# Patient Record
Sex: MALE | Race: WHITE | NOT HISPANIC OR LATINO | Employment: OTHER | URBAN - METROPOLITAN AREA
[De-identification: names, ages, dates, MRNs, and addresses within clinical notes are randomized per-mention and may not be internally consistent; named-entity substitution may affect disease eponyms.]

---

## 2017-02-07 DIAGNOSIS — I10 ESSENTIAL HYPERTENSION WITH GOAL BLOOD PRESSURE LESS THAN 140/90: ICD-10-CM

## 2017-02-07 DIAGNOSIS — I10 HYPERTENSION GOAL BP (BLOOD PRESSURE) < 140/90: ICD-10-CM

## 2017-02-07 DIAGNOSIS — E87.6 HYPOKALEMIA: Primary | ICD-10-CM

## 2017-02-07 NOTE — Clinical Note
[facility]                                           [address] [place]  [address]    February 8, 2017    Kenton Townsend  [address]  [facility] [address]    Dear Kenton,       We recently received a refill request for potassium chloride, lisinopril and amlodipine.  We have refilled this for a one time 30 day supply only because you are due for a:    Blood pressure office visit with Dr Vincent and  lab appointment      Please schedule this lab appointment 4-5 days prior to the office visit.     Please call at your earliest convenience so that there will not be a delay with your future refills.          Thank you,   Your [facility] Care Team/  [phone]

## 2017-02-08 RX ORDER — POTASSIUM CHLORIDE 750 MG/1
10 TABLET, EXTENDED RELEASE ORAL DAILY
Qty: 30 TABLET | Refills: 0 | Status: SHIPPED | OUTPATIENT
Start: 2017-02-08 | End: 2017-02-20

## 2017-02-08 RX ORDER — AMLODIPINE BESYLATE 10 MG/1
10 TABLET ORAL DAILY
Qty: 30 TABLET | Refills: 0 | Status: SHIPPED | OUTPATIENT
Start: 2017-02-08 | End: 2017-02-20

## 2017-02-08 RX ORDER — LISINOPRIL 5 MG/1
5 TABLET ORAL DAILY
Qty: 30 TABLET | Refills: 0 | Status: SHIPPED | OUTPATIENT
Start: 2017-02-08 | End: 2017-02-20

## 2017-02-08 NOTE — TELEPHONE ENCOUNTER
Pt due for nonfasting lab appointment and ov for hypertension for further refills.  Estela Garnica RN

## 2017-02-14 DIAGNOSIS — N18.2 CHRONIC RENAL INSUFFICIENCY, STAGE II (MILD): ICD-10-CM

## 2017-02-14 DIAGNOSIS — Z13.6 CARDIOVASCULAR SCREENING; LDL GOAL LESS THAN 160: ICD-10-CM

## 2017-02-14 LAB
ALBUMIN SERPL-MCNC: 4.2 G/DL (ref 3.4–5)
ANION GAP SERPL CALCULATED.3IONS-SCNC: 6 MMOL/L (ref 3–14)
BUN SERPL-MCNC: 26 MG/DL (ref 7–30)
CALCIUM SERPL-MCNC: 9 MG/DL (ref 8.5–10.1)
CHLORIDE SERPL-SCNC: 106 MMOL/L (ref 94–109)
CO2 SERPL-SCNC: 28 MMOL/L (ref 20–32)
CREAT SERPL-MCNC: 1.67 MG/DL (ref 0.66–1.25)
GFR SERPL CREATININE-BSD FRML MDRD: 40 ML/MIN/1.7M2
GLUCOSE SERPL-MCNC: 111 MG/DL (ref 70–99)
PHOSPHATE SERPL-MCNC: 2.7 MG/DL (ref 2.5–4.5)
POTASSIUM SERPL-SCNC: 4 MMOL/L (ref 3.4–5.3)
SODIUM SERPL-SCNC: 140 MMOL/L (ref 133–144)

## 2017-02-14 PROCEDURE — 80069 RENAL FUNCTION PANEL: CPT | Performed by: FAMILY MEDICINE

## 2017-02-14 PROCEDURE — 36415 COLL VENOUS BLD VENIPUNCTURE: CPT | Performed by: FAMILY MEDICINE

## 2017-02-20 ENCOUNTER — OFFICE VISIT (OUTPATIENT)
Dept: FAMILY MEDICINE | Facility: CLINIC | Age: 77
End: 2017-02-20
Payer: COMMERCIAL

## 2017-02-20 VITALS
WEIGHT: 186 LBS | BODY MASS INDEX: 28.28 KG/M2 | DIASTOLIC BLOOD PRESSURE: 70 MMHG | SYSTOLIC BLOOD PRESSURE: 120 MMHG | OXYGEN SATURATION: 97 % | TEMPERATURE: 97 F | HEART RATE: 67 BPM

## 2017-02-20 DIAGNOSIS — I10 ESSENTIAL HYPERTENSION WITH GOAL BLOOD PRESSURE LESS THAN 140/90: ICD-10-CM

## 2017-02-20 DIAGNOSIS — N18.2 CHRONIC RENAL INSUFFICIENCY, STAGE II (MILD): Primary | ICD-10-CM

## 2017-02-20 DIAGNOSIS — Z23 NEED FOR PROPHYLACTIC VACCINATION AND INOCULATION AGAINST INFLUENZA: ICD-10-CM

## 2017-02-20 DIAGNOSIS — M05.9 RHEUMATOID ARTHRITIS WITH POSITIVE RHEUMATOID FACTOR, INVOLVING UNSPECIFIED SITE (H): ICD-10-CM

## 2017-02-20 DIAGNOSIS — E87.6 HYPOKALEMIA: ICD-10-CM

## 2017-02-20 PROCEDURE — 90662 IIV NO PRSV INCREASED AG IM: CPT | Performed by: FAMILY MEDICINE

## 2017-02-20 PROCEDURE — G0008 ADMIN INFLUENZA VIRUS VAC: HCPCS | Performed by: FAMILY MEDICINE

## 2017-02-20 PROCEDURE — 99214 OFFICE O/P EST MOD 30 MIN: CPT | Mod: 25 | Performed by: FAMILY MEDICINE

## 2017-02-20 PROCEDURE — G0009 ADMIN PNEUMOCOCCAL VACCINE: HCPCS | Performed by: FAMILY MEDICINE

## 2017-02-20 PROCEDURE — 90670 PCV13 VACCINE IM: CPT | Performed by: FAMILY MEDICINE

## 2017-02-20 RX ORDER — AMLODIPINE BESYLATE 10 MG/1
10 TABLET ORAL DAILY
Qty: 90 TABLET | Refills: 1 | Status: SHIPPED | OUTPATIENT
Start: 2017-02-20 | End: 2017-09-19

## 2017-02-20 RX ORDER — LISINOPRIL 5 MG/1
5 TABLET ORAL DAILY
Qty: 90 TABLET | Refills: 1 | Status: SHIPPED | OUTPATIENT
Start: 2017-02-20 | End: 2017-09-19

## 2017-02-20 RX ORDER — POTASSIUM CHLORIDE 750 MG/1
10 TABLET, EXTENDED RELEASE ORAL DAILY
Qty: 90 TABLET | Refills: 1 | Status: SHIPPED | OUTPATIENT
Start: 2017-02-20 | End: 2017-10-19

## 2017-02-20 RX ORDER — HYDROCHLOROTHIAZIDE 12.5 MG/1
12.5 TABLET ORAL DAILY
Qty: 90 TABLET | Refills: 1 | Status: SHIPPED | OUTPATIENT
Start: 2017-02-20 | End: 2017-09-19

## 2017-02-20 NOTE — PROGRESS NOTES
SUBJECTIVE:  Kenton Townsend, a 76 year old male scheduled an appointment to discuss the following issues:  Need for prophylactic vaccination and inoculation against influenza  Follow-up htn, cri (worse), high cholesterol and Arthritis.   Water intake ok. Lots of coffee and some coke. No urine changes or hematuria. Minor leg swelling at times. No nausea, vomiting or diarrhea or bloody/black stools.  No constipation. No abdominal pain or bloody nose.  Outside blood pressure reading. No chest pain or shortness of breath. Some exercise.   Medical, social, surgical, and family histories reviewed.    ROS:    OBJECTIVE:  /70  Pulse 67  Temp 97  F (36.1  C) (Oral)  Wt 186 lb (84.4 kg)  SpO2 97%  BMI 28.28 kg/m2  EXAM:  GENERAL APPEARANCE: healthy, alert and no distress  EYES: EOMI,  PERRL  NECK: no adenopathy, no asymmetry, masses, or scars and thyroid normal to palpation  RESP: lungs clear to auscultation - no rales, rhonchi or wheezes  CV: regular rates and rhythm, normal S1 S2, no S3 or S4 and no murmur, click or rub -  ABDOMEN:  soft, nontender, no HSM or masses and bowel sounds normal  MS: extremities normal- no gross deformities noted, no evidence of inflammation in joints, FROM in all extremities.  PSYCH: mentation appears normal and affect normal/bright    ASSESSMENT / PLAN:  (N18.2) Chronic renal insufficiency, stage II (mild)  (primary encounter diagnosis)  Comment: worse  Plan: CBC with platelets, Renal panel, UA reflex to         Microscopic and Culture        Return to clinic NON-fasting in one month repeat labs. More water/less caffeine. Hold hctz if not improving. Expected course and warning signs reviewed.     (M05.9) Rheumatoid arthritis with positive rheumatoid factor, involving unspecified site (H)  Comment: stable  Plan: per rheumatology    (I10) Essential hypertension with goal blood pressure less than 140/90  Comment: stable  Plan: Renal panel, UA reflex to Microscopic and         Culture,  potassium chloride (K-TAB,KLOR-CON) 10        MEQ tablet, lisinopril (PRINIVIL/ZESTRIL) 5 MG         tablet, amLODIPine (NORVASC) 10 MG tablet,         hydrochlorothiazide 12.5 MG TABS tablet        See above. Reveiwed risks and side effects of medication  Chest pain or shortness of breath to er. Recheck in 6 months  Sooner if worse    (E87.6) Hypokalemia  Comment: stable  Plan: potassium chloride (K-TAB,KLOR-CON) 10 MEQ         tablet            (Z23) Need for prophylactic vaccination and inoculation against influenza    Plan: FLU VACCINE, INCREASED ANTIGEN, PRESV FREE, AGE        65+ [59466], PNEUMOCOCCAL CONJ VACCINE 13         VALENT IM (PREVNAR 13) [28029], Vaccine         Administration, Initial [46938]            Geovanni Vincent MD

## 2017-02-20 NOTE — PROGRESS NOTES
Blood pressure check   Injectable Influenza Immunization Documentation      1.  Is the person to be vaccinated sick today?  No    2. Does the person to be vaccinated have an allergy to eggs or to a component of the vaccine?  No    3. Has the person to be vaccinated today ever had a serious reaction to influenza vaccine in the past?  No    4. Has the person to be vaccinated ever had Guillain-Sedalia syndrome?  No     Form completed by Estela Kendrick CMA

## 2017-02-20 NOTE — MR AVS SNAPSHOT
"              After Visit Summary   2/20/2017    Kenton Townsend    MRN: [id number]           Patient Information     Date Of Birth          1940        Visit Information        Provider Department      2/20/2017 7:35 AM Geovanni Vincent MD [facility]        Today's Diagnoses     Chronic renal insufficiency, stage II (mild)    -  1    Rheumatoid arthritis with positive rheumatoid factor, involving unspecified site (H)        Essential hypertension with goal blood pressure less than 140/90        Hypokalemia        Need for prophylactic vaccination and inoculation against influenza           Follow-ups after your visit        Future tests that were ordered for you today     Open Future Orders        Priority Expected Expires Ordered    CBC with platelets Routine  2/20/2018 2/20/2017    Renal panel Routine  2/20/2018 2/20/2017    UA reflex to Microscopic and Culture Routine  2/20/2018 2/20/2017            Who to contact     If you have questions or need follow up information about today's clinic visit or your schedule please contact [facility] directly at [phone].  Normal or non-critical lab and imaging results will be communicated to you by [facility], letter or phone within 4 business days after the clinic has received the results. If you do not hear from us within 7 days, please contact the clinic through [employer] or phone. If you have a critical or abnormal lab result, we will notify you by phone as soon as possible.  Submit refill requests through [employer] or call your pharmacy and they will forward the refill request to us. Please allow 3 business days for your refill to be completed.          Additional Information About Your Visit        MyChart Information     [employer] lets you send messages to your doctor, view your test results, renew your prescriptions, schedule appointments and more. To sign up, go to [email or web address] . Click on \"Log in\" on the left side of the " "screen, which will take you to the Welcome page. Then click on \"Sign up Now\" on the right side of the page.     You will be asked to enter the access code listed below, as well as some personal information. Please follow the directions to create your username and password.     Your access code is: [email or web address]  Expires: 2017  8:11 AM     Your access code will  in 90 days. If you need help or a new code, please call your [facility] clinic or [phone].        Care EveryWhere ID     This is your Care EveryWhere ID. This could be used by other organizations to access your [facility] medical records  [id number]        Your Vitals Were     Pulse Temperature Pulse Oximetry BMI (Body Mass Index)          67 97  F (36.1  C) (Oral) 97% 28.28 kg/m2         Blood Pressure from Last 3 Encounters:   17 120/70   16 139/82   12/17/15 130/70    Weight from Last 3 Encounters:   17 186 lb (84.4 kg)   16 189 lb (85.7 kg)   12/17/15 182 lb (82.6 kg)              We Performed the Following     FLU VACCINE, INCREASED ANTIGEN, PRESV FREE, AGE 65+ [74698]     PNEUMOCOCCAL CONJ VACCINE 13 VALENT IM (PREVNAR 13) [98611]     Vaccine Administration, Initial [86246]          Today's Medication Changes          These changes are accurate as of: 17  8:11 AM.  If you have any questions, ask your nurse or doctor.               These medicines have changed or have updated prescriptions.        Dose/Directions    amLODIPine 10 MG tablet   Commonly known as:  NORVASC   This may have changed:  additional instructions   Used for:  Essential hypertension with goal blood pressure less than 140/90   Changed by:  Geovanni Vincent MD        Dose:  10 mg   Take 1 tablet (10 mg) by mouth daily For blood pressure at bedtime. Pharmacy ok to hold prescription until due   Quantity:  90 tablet   Refills:  1       lisinopril 5 MG tablet   Commonly known as:  PRINIVIL/ZESTRIL   This may have changed:  additional instructions "   Used for:  Essential hypertension with goal blood pressure less than 140/90   Changed by:  Geovanni Vincent MD        Dose:  5 mg   Take 1 tablet (5 mg) by mouth daily For blood pressure in AM. Pharmacy ok to hold prescription until due   Quantity:  90 tablet   Refills:  1       potassium chloride 10 MEQ tablet   Commonly known as:  K-TAB,KLOR-CON   This may have changed:  additional instructions   Used for:  Hypokalemia, Essential hypertension with goal blood pressure less than 140/90   Changed by:  Geovanni Vincent MD        Dose:  10 mEq   Take 1 tablet (10 mEq) by mouth daily Potassium supplement Pharmacy ok to hold prescription until due   Quantity:  90 tablet   Refills:  1            Where to get your medicines      These medications were sent to [id number] - [place] - [address]  [address] [address] [address]    Hours:  test fax successful 4/5/04 kr Phone:  [phone]     amLODIPine 10 MG tablet    hydrochlorothiazide 12.5 MG Tabs tablet    lisinopril 5 MG tablet    potassium chloride 10 MEQ tablet                Primary Care Provider Office Phone # Fax #    Geovanni Vincent [phone]       [facility] [address] [facility] [address]        Thank you!     Thank you for choosing [facility]  for your care. Our goal is always to provide you with excellent care. Hearing back from our patients is one way we can continue to improve our services. Please take a few minutes to complete the written survey that you may receive in the mail after your visit with us. Thank you!             Your Updated Medication List - Protect others around you: Learn how to safely use, store and throw away your medicines at www.disposemymeds.org.          This list is accurate as of: 2/20/17  8:11 AM.  Always use your most recent med list.                   Brand Name Dispense Instructions for use    amLODIPine 10 MG tablet    NORVASC     90 tablet    Take 1 tablet (10 mg) by mouth daily For blood pressure at bedtime. Pharmacy ok to hold prescription until due       COMBIGAN 0.2-0.5 % ophthalmic solution   Generic drug:  brimonidine-timolol      1 drop 2 times daily.       folic acid 400 MCG tablet    FOLVITE     1 TABLET DAILY       hydrochlorothiazide 12.5 MG Tabs tablet     90 tablet    Take 1 tablet (12.5 mg) by mouth daily For blood pressure in AM Pharmacy ok to hold prescription until due       lisinopril 5 MG tablet    PRINIVIL/ZESTRIL    90 tablet    Take 1 tablet (5 mg) by mouth daily For blood pressure in AM. Pharmacy ok to hold prescription until due       methotrexate sodium 2.5 MG Tabs      4 tablets weekly       potassium chloride 10 MEQ tablet    K-TAB,KLOR-CON    90 tablet    Take 1 tablet (10 mEq) by mouth daily Potassium supplement Pharmacy ok to hold prescription until due       PRESERVISION AREDS PO      1 tablet twice daily       traMADol 50 MG tablet    ULTRAM     1 TABLET EVERY 4 TO 6 HOURS AS NEEDED       XALATAN 0.005 % ophthalmic solution   Generic drug:  latanoprost      Nightly

## 2017-02-20 NOTE — NURSING NOTE
"Chief Complaint   Patient presents with     Hypertension       Initial /70  Pulse 67  Temp 97  F (36.1  C) (Oral)  Wt 186 lb (84.4 kg)  SpO2 97%  BMI 28.28 kg/m2 Estimated body mass index is 28.28 kg/(m^2) as calculated from the following:    Height as of 8/26/15: 5' 8\" (1.727 m).    Weight as of this encounter: 186 lb (84.4 kg).  Medication Reconciliation: complete   Estela Kendrick CMA    "

## 2017-08-24 ENCOUNTER — TRANSFERRED RECORDS (OUTPATIENT)
Dept: HEALTH INFORMATION MANAGEMENT | Facility: CLINIC | Age: 77
End: 2017-08-24

## 2017-09-19 DIAGNOSIS — I10 ESSENTIAL HYPERTENSION WITH GOAL BLOOD PRESSURE LESS THAN 140/90: ICD-10-CM

## 2017-09-19 RX ORDER — AMLODIPINE BESYLATE 10 MG/1
TABLET ORAL
Qty: 30 TABLET | Refills: 0 | Status: SHIPPED | OUTPATIENT
Start: 2017-09-19 | End: 2017-10-19

## 2017-09-19 RX ORDER — LISINOPRIL 5 MG/1
TABLET ORAL
Qty: 30 TABLET | Refills: 0 | Status: SHIPPED | OUTPATIENT
Start: 2017-09-19 | End: 2017-10-19

## 2017-09-19 RX ORDER — HYDROCHLOROTHIAZIDE 12.5 MG/1
TABLET ORAL
Qty: 30 TABLET | Refills: 0 | Status: SHIPPED | OUTPATIENT
Start: 2017-09-19 | End: 2017-10-19

## 2017-09-19 NOTE — TELEPHONE ENCOUNTER
Pt due for fasting lab appointment and ov for hypertension for further refills.  Estela Garnica RN

## 2017-09-19 NOTE — LETTER
September 19, 2017    Kenton Townsend  [address]  [place] [address]    Dear Kenton,       We recently received a refill request for hydrochlorothiazide , lisinopril, and amlodipine.  We have refilled this for a one time 30 day supply only because you are due for a:    Blood pressure office visit with provider and fasting lab appointment      Please schedule this lab appointment 4-5 days prior to the office visit.     Please call at your earliest convenience so that there will not be a delay with your future refills.          Thank you,   Your [facility] Team/  [phone]

## 2017-10-13 DIAGNOSIS — I10 ESSENTIAL HYPERTENSION WITH GOAL BLOOD PRESSURE LESS THAN 140/90: ICD-10-CM

## 2017-10-13 DIAGNOSIS — N18.2 CHRONIC RENAL INSUFFICIENCY, STAGE II (MILD): ICD-10-CM

## 2017-10-13 LAB
ALBUMIN SERPL-MCNC: 3.9 G/DL (ref 3.4–5)
ALBUMIN UR-MCNC: ABNORMAL MG/DL
ANION GAP SERPL CALCULATED.3IONS-SCNC: 6 MMOL/L (ref 3–14)
APPEARANCE UR: CLEAR
BILIRUB UR QL STRIP: NEGATIVE
BUN SERPL-MCNC: 23 MG/DL (ref 7–30)
CALCIUM SERPL-MCNC: 9 MG/DL (ref 8.5–10.1)
CHLORIDE SERPL-SCNC: 107 MMOL/L (ref 94–109)
CO2 SERPL-SCNC: 27 MMOL/L (ref 20–32)
COLOR UR AUTO: YELLOW
CREAT SERPL-MCNC: 1.38 MG/DL (ref 0.66–1.25)
ERYTHROCYTE [DISTWIDTH] IN BLOOD BY AUTOMATED COUNT: 14.9 % (ref 10–15)
GFR SERPL CREATININE-BSD FRML MDRD: 50 ML/MIN/1.7M2
GLUCOSE SERPL-MCNC: 102 MG/DL (ref 70–99)
GLUCOSE UR STRIP-MCNC: NEGATIVE MG/DL
HCT VFR BLD AUTO: 40.2 % (ref 40–53)
HGB BLD-MCNC: 14 G/DL (ref 13.3–17.7)
HGB UR QL STRIP: ABNORMAL
KETONES UR STRIP-MCNC: NEGATIVE MG/DL
LEUKOCYTE ESTERASE UR QL STRIP: NEGATIVE
MCH RBC QN AUTO: 34.6 PG (ref 26.5–33)
MCHC RBC AUTO-ENTMCNC: 34.8 G/DL (ref 31.5–36.5)
MCV RBC AUTO: 99 FL (ref 78–100)
NITRATE UR QL: NEGATIVE
PH UR STRIP: 5.5 PH (ref 5–7)
PHOSPHATE SERPL-MCNC: 2.6 MG/DL (ref 2.5–4.5)
PLATELET # BLD AUTO: 118 10E9/L (ref 150–450)
POTASSIUM SERPL-SCNC: 4.3 MMOL/L (ref 3.4–5.3)
RBC # BLD AUTO: 4.05 10E12/L (ref 4.4–5.9)
RBC #/AREA URNS AUTO: NORMAL /HPF
SODIUM SERPL-SCNC: 140 MMOL/L (ref 133–144)
SOURCE: ABNORMAL
SP GR UR STRIP: 1.02 (ref 1–1.03)
UROBILINOGEN UR STRIP-ACNC: 0.2 EU/DL (ref 0.2–1)
WBC # BLD AUTO: 4.6 10E9/L (ref 4–11)
WBC #/AREA URNS AUTO: NORMAL /HPF

## 2017-10-13 PROCEDURE — 85027 COMPLETE CBC AUTOMATED: CPT | Performed by: FAMILY MEDICINE

## 2017-10-13 PROCEDURE — 80069 RENAL FUNCTION PANEL: CPT | Performed by: FAMILY MEDICINE

## 2017-10-13 PROCEDURE — 81001 URINALYSIS AUTO W/SCOPE: CPT | Performed by: FAMILY MEDICINE

## 2017-10-13 PROCEDURE — 36415 COLL VENOUS BLD VENIPUNCTURE: CPT | Performed by: FAMILY MEDICINE

## 2017-10-15 ENCOUNTER — HEALTH MAINTENANCE LETTER (OUTPATIENT)
Age: 77
End: 2017-10-15

## 2017-10-19 ENCOUNTER — OFFICE VISIT (OUTPATIENT)
Dept: FAMILY MEDICINE | Facility: CLINIC | Age: 77
End: 2017-10-19
Payer: COMMERCIAL

## 2017-10-19 VITALS
HEIGHT: 68 IN | SYSTOLIC BLOOD PRESSURE: 136 MMHG | WEIGHT: 185 LBS | TEMPERATURE: 97.5 F | HEART RATE: 64 BPM | DIASTOLIC BLOOD PRESSURE: 68 MMHG | BODY MASS INDEX: 28.04 KG/M2 | OXYGEN SATURATION: 97 %

## 2017-10-19 DIAGNOSIS — E87.6 HYPOKALEMIA: ICD-10-CM

## 2017-10-19 DIAGNOSIS — Z23 NEED FOR PROPHYLACTIC VACCINATION AND INOCULATION AGAINST INFLUENZA: ICD-10-CM

## 2017-10-19 DIAGNOSIS — M05.9 RHEUMATOID ARTHRITIS WITH POSITIVE RHEUMATOID FACTOR, INVOLVING UNSPECIFIED SITE (H): ICD-10-CM

## 2017-10-19 DIAGNOSIS — I10 ESSENTIAL HYPERTENSION WITH GOAL BLOOD PRESSURE LESS THAN 140/90: Primary | ICD-10-CM

## 2017-10-19 PROCEDURE — 90662 IIV NO PRSV INCREASED AG IM: CPT | Performed by: FAMILY MEDICINE

## 2017-10-19 PROCEDURE — 99214 OFFICE O/P EST MOD 30 MIN: CPT | Mod: 25 | Performed by: FAMILY MEDICINE

## 2017-10-19 PROCEDURE — G0008 ADMIN INFLUENZA VIRUS VAC: HCPCS | Performed by: FAMILY MEDICINE

## 2017-10-19 RX ORDER — POTASSIUM CHLORIDE 750 MG/1
10 TABLET, EXTENDED RELEASE ORAL DAILY
Qty: 90 TABLET | Refills: 1 | Status: SHIPPED | OUTPATIENT
Start: 2017-10-19 | End: 2018-05-09

## 2017-10-19 RX ORDER — LISINOPRIL 5 MG/1
TABLET ORAL
Qty: 90 TABLET | Refills: 1 | Status: SHIPPED | OUTPATIENT
Start: 2017-10-19 | End: 2018-05-09

## 2017-10-19 RX ORDER — AMLODIPINE BESYLATE 10 MG/1
TABLET ORAL
Qty: 90 TABLET | Refills: 1 | Status: SHIPPED | OUTPATIENT
Start: 2017-10-19 | End: 2018-01-20

## 2017-10-19 RX ORDER — HYDROCHLOROTHIAZIDE 12.5 MG/1
TABLET ORAL
Qty: 90 TABLET | Refills: 1 | Status: SHIPPED | OUTPATIENT
Start: 2017-10-19 | End: 2018-05-09

## 2017-10-19 NOTE — NURSING NOTE
"Chief Complaint   Patient presents with     Hypertension     Flu Shot       Initial /68  Pulse 64  Temp 97.5  F (36.4  C) (Oral)  Ht 5' 8\" (1.727 m)  Wt 185 lb (83.9 kg)  SpO2 97%  BMI 28.13 kg/m2 Estimated body mass index is 28.13 kg/(m^2) as calculated from the following:    Height as of this encounter: 5' 8\" (1.727 m).    Weight as of this encounter: 185 lb (83.9 kg).  Medication Reconciliation: complete  Estela Kendrick CMA    "

## 2017-10-19 NOTE — PROGRESS NOTES
Injectable Influenza Immunization Documentation    1.  Is the person to be vaccinated sick today?   No    2. Does the person to be vaccinated have an allergy to a component   of the vaccine?   No    3. Has the person to be vaccinated ever had a serious reaction   to influenza vaccine in the past?   No    4. Has the person to be vaccinated ever had Guillain-Barré syndrome?   No    Form completed by Estela Kendrick [facility]

## 2017-10-19 NOTE — PROGRESS NOTES
"SUBJECTIVE:  Kenton Townsend, a 77 year old male scheduled an appointment to discuss the following issues:  Need for prophylactic vaccination and inoculation against influenza  Follow-up htn, cri , high cholesterol and Arthritis.  No nsaids. One baby asa daily. Water intake ok. 3 cups/coffee day.   No dysuria  Or hematuria. No edema. No chest pain or shortness of breath.   Exercise - needs a little more. No nausea, vomiting or diarrhea or black/bloody stools. Emotionally ok.   Outside blood pressure reading ok. Limited sodium in diet.   Past Medical History:   Diagnosis Date     Glaucoma      Hypertension      rheumatoid arthritis        Past Surgical History:   Procedure Laterality Date     CATARACT IOL, RT/LT  2009    Cataract IOL RT/LT     JOINT REPLACEMENT, HIP RT/LT Left > 20 years ago    Joint Replacement Hip RT/LT     SURGICAL HISTORY OF -   13 years ago    humerus left       Family History   Problem Relation Age of Onset     CANCER Father      unknown     CANCER Brother      [place]      Eye Disorder Brother      Neurologic Disorder Son      vertigo       Social History   Substance Use Topics     Smoking status: Former Smoker     Types: Cigarettes     Quit date: 11/16/1979     Smokeless tobacco: Never Used     Alcohol use Yes      Comment: couple times a week       ROS:  All other ROS negative  OBJECTIVE:  /68  Pulse 64  Temp 97.5  F (36.4  C) (Oral)  Ht 5' 8\" (1.727 m)  Wt 185 lb (83.9 kg)  SpO2 97%  BMI 28.13 kg/m2  EXAM:  GENERAL APPEARANCE: healthy, alert and no distress  EYES: EOMI,  PERRL  NECK: no adenopathy, no asymmetry, masses, or scars and thyroid normal to palpation  RESP: lungs clear to auscultation - no rales, rhonchi or wheezes  CV: regular rates and rhythm, normal S1 S2, no S3 or S4 and no murmur, click or rub -  ABDOMEN:  soft, nontender, no HSM or masses and bowel sounds normal  MS: arthritic changes bilateral hands  PSYCH: mentation appears normal and affect " normal/bright    ASSESSMENT / PLAN:  (I10) Essential hypertension with goal blood pressure less than 140/90  (primary encounter diagnosis)  Comment: stable  Plan: amLODIPine (NORVASC) 10 MG tablet, lisinopril         (PRINIVIL/ZESTRIL) 5 MG tablet,         hydrochlorothiazide 12.5 MG TABS tablet,         potassium chloride (K-TAB,KLOR-CON) 10 MEQ         tablet        Continue self-monitor and limit sodium. Chest pain or shortness of breath to er. Recheck in 6 months  Cr improving. Consider stopping hctz if worse.     (E87.6) Hypokalemia  Comment: stable  Plan: potassium chloride (K-TAB,KLOR-CON) 10 MEQ         tablet        Lots of fruits/veggies    (M05.9) Rheumatoid arthritis with positive rheumatoid factor, involving unspecified site (H)  Comment: stable  Plan: continue meds per rheum    (Z23) Need for prophylactic vaccination and inoculation against influenza  Plan: Vaccine Administration, Initial [28241], FLU         VACCINE, INCREASED ANTIGEN, PRESV FREE, AGE 65+        [02706]            Geovanni Vincent

## 2017-10-19 NOTE — MR AVS SNAPSHOT
"              After Visit Summary   10/19/2017    Kenton Townsend    MRN: [id number]           Patient Information     Date Of Birth          1940        Visit Information        Provider Department      10/19/2017 8:20 AM Geovanni Vincent MD [facility]        Today's Diagnoses     Essential hypertension with goal blood pressure less than 140/90    -  1    Hypokalemia        Rheumatoid arthritis with positive rheumatoid factor, involving unspecified site (H)        Need for prophylactic vaccination and inoculation against influenza           Follow-ups after your visit        Who to contact     If you have questions or need follow up information about today's clinic visit or your schedule please contact [facility] directly at [phone].  Normal or non-critical lab and imaging results will be communicated to you by MyChart, letter or phone within 4 business days after the clinic has received the results. If you do not hear from us within 7 days, please contact the clinic through MyChart or phone. If you have a critical or abnormal lab result, we will notify you by phone as soon as possible.  Submit refill requests through [employer] or call your pharmacy and they will forward the refill request to us. Please allow 3 business days for your refill to be completed.          Additional Information About Your Visit        MyChart Information     [employer] lets you send messages to your doctor, view your test results, renew your prescriptions, schedule appointments and more. To sign up, go to [email or web address] . Click on \"Log in\" on the left side of the screen, which will take you to the Welcome page. Then click on \"Sign up Now\" on the right side of the page.     You will be asked to enter the access code listed below, as well as some personal information. Please follow the directions to create your username and password.     Your access code is: [email or web address]  Expires: 1/17/2018  " "8:40 AM     Your access code will  in 90 days. If you need help or a new code, please call your [place] clinic or [phone].        Care EveryWhere ID     This is your Care EveryWhere ID. This could be used by other organizations to access your [place] medical records  [id number]        Your Vitals Were     Pulse Temperature Height Pulse Oximetry BMI (Body Mass Index)       64 97.5  F (36.4  C) (Oral) 5' 8\" (1.727 m) 97% 28.13 kg/m2        Blood Pressure from Last 3 Encounters:   10/19/17 136/68   17 120/70   16 139/82    Weight from Last 3 Encounters:   10/19/17 185 lb (83.9 kg)   17 186 lb (84.4 kg)   16 189 lb (85.7 kg)              We Performed the Following     FLU VACCINE, INCREASED ANTIGEN, PRESV FREE, AGE 65+ [61461]     Vaccine Administration, Initial [10272]          Today's Medication Changes          These changes are accurate as of: 10/19/17  8:40 AM.  If you have any questions, ask your nurse or doctor.               These medicines have changed or have updated prescriptions.        Dose/Directions    amLODIPine 10 MG tablet   Commonly known as:  NORVASC   This may have changed:  See the new instructions.   Used for:  Essential hypertension with goal blood pressure less than 140/90   Changed by:  Geovanni Vincent MD        TAKE 1 TABLET BY MOUTH EVERY NIGHT AT BEDTIME FOR BLOOD PRESSURE Pharmacy ok to hold prescription until due   Quantity:  90 tablet   Refills:  1       hydrochlorothiazide 12.5 MG Tabs tablet   This may have changed:  See the new instructions.   Used for:  Essential hypertension with goal blood pressure less than 140/90   Changed by:  Geovanni Vincent MD        TAKE 1 TABLET BY MOUTH DAILY FOR BLOOD PRESSURE IN THE MORNING Pharmacy ok to hold prescription until due   Quantity:  90 tablet   Refills:  1       lisinopril 5 MG tablet   Commonly known as:  PRINIVIL/ZESTRIL   This may have changed:  See the new instructions.   Used for:  Essential " hypertension with goal blood pressure less than 140/90   Changed by:  Geovanni Vincent MD        TAKE 1 TABLET BY MOUTH EVERY MORNING FOR BLOOD PRESSURE Pharmacy ok to hold prescription until due   Quantity:  90 tablet   Refills:  1            Where to get your medicines      These medications were sent to [id number] - [place] - [address]  [address] [address] [address]    Hours:  test fax successful 4/5/04 kr Phone:  [phone]     amLODIPine 10 MG tablet    hydrochlorothiazide 12.5 MG Tabs tablet    lisinopril 5 MG tablet    potassium chloride 10 MEQ tablet                Primary Care Provider Office Phone # Fax #    Geovanni Vincent [phone]       [address] [facility] [address]        Equal Access to Services     REGINE HDZ : Hadii elie grover hadasho Soomaali, waaxda luqadaha, qaybta kaalmada adeegyada, waxlorrie garcia hayakash bravo . So [facility] [phone].    ATENCIÓN: Si habla español, tiene a calderon disposición servicios gratuitos de asistencia lingüística. Llame al [phone].    We comply with applicable federal civil rights laws and [place] laws. We do not discriminate on the basis of race, color, national origin, age, disability, sex, sexual orientation, or gender identity.            Thank you!     Thank you for choosing [facility]  for your care. Our goal is always to provide you with excellent care. Hearing back from our patients is one way we can continue to improve our services. Please take a few minutes to complete the written survey that you may receive in the mail after your visit with us. Thank you!             Your Updated Medication List - Protect others around you: Learn how to safely use, store and throw away your medicines at www.disposemymeds.org.          This list is accurate as of: 10/19/17  8:40 AM.  Always use your most recent med list.                   Brand Name Dispense Instructions for  use Diagnosis    amLODIPine 10 MG tablet    NORVASC    90 tablet    TAKE 1 TABLET BY MOUTH EVERY NIGHT AT BEDTIME FOR BLOOD PRESSURE Pharmacy ok to hold prescription until due    Essential hypertension with goal blood pressure less than 140/90       COMBIGAN 0.2-0.5 % ophthalmic solution   Generic drug:  brimonidine-timolol      1 drop 2 times daily.        folic acid 400 MCG tablet    FOLVITE     1 TABLET DAILY        hydrochlorothiazide 12.5 MG Tabs tablet     90 tablet    TAKE 1 TABLET BY MOUTH DAILY FOR BLOOD PRESSURE IN THE MORNING Pharmacy ok to hold prescription until due    Essential hypertension with goal blood pressure less than 140/90       lisinopril 5 MG tablet    PRINIVIL/ZESTRIL    90 tablet    TAKE 1 TABLET BY MOUTH EVERY MORNING FOR BLOOD PRESSURE Pharmacy ok to hold prescription until due    Essential hypertension with goal blood pressure less than 140/90       methotrexate sodium 2.5 MG Tabs      4 tablets weekly        potassium chloride 10 MEQ tablet    K-TAB,KLOR-CON    90 tablet    Take 1 tablet (10 mEq) by mouth daily Potassium supplement Pharmacy ok to hold prescription until due    Hypokalemia, Essential hypertension with goal blood pressure less than 140/90       PRESERVISION AREDS PO      1 tablet twice daily        traMADol 50 MG tablet    ULTRAM     1 TABLET EVERY 4 TO 6 HOURS AS NEEDED        XALATAN 0.005 % ophthalmic solution   Generic drug:  latanoprost      Nightly

## 2018-01-20 DIAGNOSIS — I10 ESSENTIAL HYPERTENSION WITH GOAL BLOOD PRESSURE LESS THAN 140/90: ICD-10-CM

## 2018-01-23 RX ORDER — AMLODIPINE BESYLATE 10 MG/1
TABLET ORAL
Qty: 90 TABLET | Refills: 0 | Status: SHIPPED | OUTPATIENT
Start: 2018-01-23 | End: 2021-05-06

## 2018-05-09 DIAGNOSIS — E87.6 HYPOKALEMIA: ICD-10-CM

## 2018-05-09 DIAGNOSIS — I10 ESSENTIAL HYPERTENSION WITH GOAL BLOOD PRESSURE LESS THAN 140/90: ICD-10-CM

## 2018-05-09 RX ORDER — POTASSIUM CHLORIDE 750 MG/1
TABLET, EXTENDED RELEASE ORAL
Qty: 90 TABLET | Refills: 1 | Status: SHIPPED | OUTPATIENT
Start: 2018-05-09 | End: 2018-12-12

## 2018-05-09 RX ORDER — HYDROCHLOROTHIAZIDE 12.5 MG/1
TABLET ORAL
Qty: 90 TABLET | Refills: 1 | Status: SHIPPED | OUTPATIENT
Start: 2018-05-09 | End: 2018-11-15

## 2018-05-09 RX ORDER — LISINOPRIL 5 MG/1
TABLET ORAL
Qty: 90 TABLET | Refills: 1 | Status: SHIPPED | OUTPATIENT
Start: 2018-05-09 | End: 2018-11-15

## 2018-05-30 ENCOUNTER — OFFICE VISIT (OUTPATIENT)
Dept: PODIATRY | Facility: CLINIC | Age: 78
End: 2018-05-30
Payer: COMMERCIAL

## 2018-05-30 VITALS
HEART RATE: 70 BPM | BODY MASS INDEX: 30.99 KG/M2 | SYSTOLIC BLOOD PRESSURE: 150 MMHG | WEIGHT: 186 LBS | HEIGHT: 65 IN | DIASTOLIC BLOOD PRESSURE: 70 MMHG

## 2018-05-30 DIAGNOSIS — M21.619 BUNION: ICD-10-CM

## 2018-05-30 DIAGNOSIS — M05.9 RHEUMATOID ARTHRITIS WITH POSITIVE RHEUMATOID FACTOR, INVOLVING UNSPECIFIED SITE (H): ICD-10-CM

## 2018-05-30 DIAGNOSIS — M20.42 HAMMER TOE OF LEFT FOOT: Primary | ICD-10-CM

## 2018-05-30 PROCEDURE — 99213 OFFICE O/P EST LOW 20 MIN: CPT | Performed by: PODIATRIST

## 2018-05-30 RX ORDER — PREDNISONE 5 MG/1
5 TABLET ORAL DAILY
COMMUNITY
Start: 2018-05-09 | End: 2022-02-18

## 2018-05-30 NOTE — PROGRESS NOTES
Subjective:    Pt is seen today with the c/c of painful left foot.  This has been symptomatic for the past several months.  Points to PIPJs of 2/3/4 toes.  Has pain w/ ambulation and shoewear and is relieved by rest and going barefoot.  Denies pain in the contralateral foot.  Describes as burning pain.  Has RA.  History of left heel pathology and would like new silcone pad.         ROS:  A 10-point review of systems was performed and is positive for that noted in the HPI and noted above.  All other areas are negative.        Allergies   Allergen Reactions     Nkda [No Known Drug Allergies]        Current Outpatient Prescriptions   Medication Sig Dispense Refill     amLODIPine (NORVASC) 10 MG tablet TAKE 1 TABLET BY MOUTH ONCE DAILY 90 tablet 0     brimonidine-timolol (COMBIGAN) 0.2-0.5 % ophthalmic solution 1 drop 2 times daily.       FOLIC ACID 400 MCG OR TABS 1 TABLET DAILY       hydrochlorothiazide 12.5 MG TABS tablet TAKE ONE TABLET BY MOUTH IN THE MORNING  90 tablet 1     lisinopril (PRINIVIL/ZESTRIL) 5 MG tablet TAKE ONE TABLET BY MOUTH IN THE MORNING  90 tablet 1     METHOTREXATE SODIUM 2.5 MG OR TABS 4 tablets weekly       potassium chloride (K-TAB,KLOR-CON) 10 MEQ tablet TAKE 1 TABLET BY MOUTH ONCE DAILY 90 tablet 1     predniSONE (DELTASONE) 5 MG tablet 5 mg       PRESERVISION AREDS OR 1 tablet twice daily       TRAMADOL HCL 50 MG OR TABS 1 TABLET EVERY 4 TO 6 HOURS AS NEEDED       XALATAN 0.005 % OP SOLN Nightly         Patient Active Problem List   Diagnosis     RA (rheumatoid arthritis) (H)     Glaucoma     CARDIOVASCULAR SCREENING; LDL GOAL LESS THAN 160     Vision loss, left eye     1st degree AV block     Advanced directives, counseling/discussion     Obesity     Hypertension goal BP (blood pressure) < 140/90     Anemia of chronic renal failure, stage 2 (mild)     Chronic renal insufficiency, stage II (mild)     Essential hypertension with goal blood pressure less than 140/90     Rheumatoid  "arthritis with positive rheumatoid factor, involving unspecified site (H)       Past Medical History:   Diagnosis Date     Glaucoma      Hypertension      rheumatoid arthritis        Past Surgical History:   Procedure Laterality Date     CATARACT IOL, RT/LT  2009    Cataract IOL RT/LT     JOINT REPLACEMENT, HIP RT/LT Left > 20 years ago    Joint Replacement Hip RT/LT     SURGICAL HISTORY OF -   13 years ago    humerus left       Family History   Problem Relation Age of Onset     CANCER Father      unknown     CANCER Brother      Hodskins      Eye Disorder Brother      Neurologic Disorder Son      vertigo       Social History   Substance Use Topics     Smoking status: Former Smoker     Types: Cigarettes     Quit date: 11/16/1979     Smokeless tobacco: Never Used     Alcohol use Yes      Comment: couple times a week       Objective:    O:  /70  Pulse 70  Ht 5' 5\" (1.651 m)  Wt 186 lb (84.4 kg)  BMI 30.95 kg/m2.      Constitutional/ general:  Pt is in no apparent distress, appears well-nourished.  Cooperative with history and physical exam.     Psych:  The patient answered questions appropriately.  Normal affect.  Seems to have reasonable expectations, in terms of treatment.     Eyes:  Visual scanning/ tracking without deficit.     Ears:  Response to auditory stimuli is normal.  positive hearing aid devices.  Auricles in proper alignment.     Lymphatic:  Popliteal lymph nodes not enlarged.     Lungs:  Non labored breathing, non labored speech. No cough.  No audible wheezing. Even, quiet breathing.       Vascular:  positive pedal pulses bilaterally for both the DP and PT arteries.  CFT < 3 sec.  positive ankle edema.  positive pedal hair growth.    Neuro:  Alert and oriented x 3. Coordinated gait.  Light touch sensation is intact to the L4, L5, S1 distributions. No obvious deficits.  No evidence of neurological-based weakness, spasticity, or contracture in the lower extremities.      Derm: Normal texture and " turgor.  No erythema, ecchymosis, or cyanosis.  Scar tissue left heel.       Musculoskeletal:    Lower extremity muscle strength is normal.  Patient is ambulatory without an assistive device or brace.  No gross deformities.        Bilateral hammertoe deformities noted with weightbearing with the left being worse.  Left HAV.  Erythema and pain between toes.  No masses noted.  No pain on the metatarsal heads or dorsally.       Assessment:  Left 2/3/4 hammertoes                          RA    Plan:  Discussed conservative treatments such as good shoes with wide forefoot.  Dispensed silipos pads.   Good quality shoes to keep pressure off toe.   Return to clinic prn.    Smooth Holbrook DPM, FACFAS

## 2018-05-30 NOTE — LETTER
5/30/2018         RE: Kenton Townsend  [address]  [place] [address]        Dear Colleague,    Thank you for referring your patient, Kenton Townsend, to the [facility]. Please see a copy of my visit note below.     Subjective:    Pt is seen today with the c/c of painful left foot.  This has been symptomatic for the past several months.  Points to PIPJs of 2/3/4 toes.  Has pain w/ ambulation and shoewear and is relieved by rest and going barefoot.  Denies pain in the contralateral foot.  Describes as burning pain.  Has RA.  History of left heel pathology and would like new silcone pad.         ROS:  A 10-point review of systems was performed and is positive for that noted in the HPI and noted above.  All other areas are negative.        Allergies   Allergen Reactions     Nkda [No Known Drug Allergies]        Current Outpatient Prescriptions   Medication Sig Dispense Refill     amLODIPine (NORVASC) 10 MG tablet TAKE 1 TABLET BY MOUTH ONCE DAILY 90 tablet 0     brimonidine-timolol (COMBIGAN) 0.2-0.5 % ophthalmic solution 1 drop 2 times daily.       FOLIC ACID 400 MCG OR TABS 1 TABLET DAILY       hydrochlorothiazide 12.5 MG TABS tablet TAKE ONE TABLET BY MOUTH IN THE MORNING  90 tablet 1     lisinopril (PRINIVIL/ZESTRIL) 5 MG tablet TAKE ONE TABLET BY MOUTH IN THE MORNING  90 tablet 1     METHOTREXATE SODIUM 2.5 MG OR TABS 4 tablets weekly       potassium chloride (K-TAB,KLOR-CON) 10 MEQ tablet TAKE 1 TABLET BY MOUTH ONCE DAILY 90 tablet 1     predniSONE (DELTASONE) 5 MG tablet 5 mg       PRESERVISION AREDS OR 1 tablet twice daily       TRAMADOL HCL 50 MG OR TABS 1 TABLET EVERY 4 TO 6 HOURS AS NEEDED       XALATAN 0.005 % OP SOLN Nightly         Patient Active Problem List   Diagnosis     RA (rheumatoid arthritis) (H)     Glaucoma     CARDIOVASCULAR SCREENING; LDL GOAL LESS THAN 160     Vision loss, left eye     1st degree AV block     Advanced directives, counseling/discussion      "Obesity     Hypertension goal BP (blood pressure) < 140/90     Anemia of chronic renal failure, stage 2 (mild)     Chronic renal insufficiency, stage II (mild)     Essential hypertension with goal blood pressure less than 140/90     Rheumatoid arthritis with positive rheumatoid factor, involving unspecified site (H)       Past Medical History:   Diagnosis Date     Glaucoma      Hypertension      rheumatoid arthritis        Past Surgical History:   Procedure Laterality Date     CATARACT IOL, RT/LT  2009    Cataract IOL RT/LT     JOINT REPLACEMENT, HIP RT/LT Left > 20 years ago    Joint Replacement Hip RT/LT     SURGICAL HISTORY OF -   13 years ago    humerus left       Family History   Problem Relation Age of Onset     CANCER Father      unknown     CANCER Brother      Hodskins      Eye Disorder Brother      Neurologic Disorder Son      vertigo       Social History   Substance Use Topics     Smoking status: Former Smoker     Types: Cigarettes     Quit date: 11/16/1979     Smokeless tobacco: Never Used     Alcohol use Yes      Comment: couple times a week       Objective:    O:  /70  Pulse 70  Ht 5' 5\" (1.651 m)  Wt 186 lb (84.4 kg)  BMI 30.95 kg/m2.      Constitutional/ general:  Pt is in no apparent distress, appears well-nourished.  Cooperative with history and physical exam.     Psych:  The patient answered questions appropriately.  Normal affect.  Seems to have reasonable expectations, in terms of treatment.     Eyes:  Visual scanning/ tracking without deficit.     Ears:  Response to auditory stimuli is normal.  positive hearing aid devices.  Auricles in proper alignment.     Lymphatic:  Popliteal lymph nodes not enlarged.     Lungs:  Non labored breathing, non labored speech. No cough.  No audible wheezing. Even, quiet breathing.       Vascular:  positive pedal pulses bilaterally for both the DP and PT arteries.  CFT < 3 sec.  positive ankle edema.  positive pedal hair growth.    Neuro:  Alert and " oriented x 3. Coordinated gait.  Light touch sensation is intact to the L4, L5, S1 distributions. No obvious deficits.  No evidence of neurological-based weakness, spasticity, or contracture in the lower extremities.      Derm: Normal texture and turgor.  No erythema, ecchymosis, or cyanosis.  Scar tissue left heel.       Musculoskeletal:    Lower extremity muscle strength is normal.  Patient is ambulatory without an assistive device or brace.  No gross deformities.        Bilateral hammertoe deformities noted with weightbearing with the left being worse.  Left HAV.  Erythema and pain between toes.  No masses noted.  No pain on the metatarsal heads or dorsally.       Assessment:  Left 2/3/4 hammertoes                          RA    Plan:  Discussed conservative treatments such as good shoes with wide forefoot.  Dispensed silipos pads.   Good quality shoes to keep pressure off toe.   Return to clinic prn.    Smooth Holbrook DPM, FACFAS        Again, thank you for allowing me to participate in the care of your patient.        Sincerely,        Smooth Holbrook DPM

## 2018-05-30 NOTE — PATIENT INSTRUCTIONS
We wish you continued good healing. If you have any questions or concerns, please do not hesitate to contact us at [phone]    Please remember to call and schedule a follow up appointment if one was recommended at your earliest convenience.   PODIATRY CLINIC HOURS  TELEPHONE NUMBER    Dr. Smooth Holbrook D.P.M [facility]    Clinics:  [facility]    Mónica Mckeon [facility]   Tuesday 1PM-6PM  [place]  Wednesday 7AM-2PM  [facility]  Thursday 10AM-6PM  [place]  Friday 7AM-3PM  [place]  Specialty schedulers:   [phone] to make an appointment with any Specialty Provider.        Urgent Care locations:    [facility] Monday-Friday 5 pm - 9 pm. Saturday-Sunday 9 am -5pm    Monday-Friday 11 am - 9 pm Saturday 9 am - 5 pm     Monday-Sunday 12 noon-8PM [phone]     If you need a medication refill, please contact us you may need lab work and/or a follow up visit prior to your refill (i.e. Antifungal medications).    Vsnapt (secure e-mail communication and access to your chart) to send a message or to make an appointment.    If MRI needed please call Rhys Rea at [phone]        Weight management plan: Patient was referred to their PCP to discuss a diet and exercise plan.     Patient to follow up with Primary Care provider regarding elevated blood pressure.

## 2018-05-30 NOTE — MR AVS SNAPSHOT
After Visit Summary   5/30/2018    Kenton Townsend    MRN: [id number]           Patient Information     Date Of Birth          1940        Visit Information        Provider Department      5/30/2018 9:15 AM Smooth Holbrook DPM [facility]        Care Instructions    We wish you continued good healing. If you have any questions or concerns, please do not hesitate to contact us at [phone]    Please remember to call and schedule a follow up appointment if one was recommended at your earliest convenience.   PODIATRY CLINIC HOURS  TELEPHONE NUMBER    Dr. Smooth Holbrook D.P.M FAC FAS    Clinics:  [facility]    Mónica Mckeon [facility]   Tuesday 1PM-6PM  [facility]  Wednesday 7AM-2PM  [place]  Thursday 10AM-6PM  [place]  Friday 7AM-3PM  [place]  Specialty schedulers:   [phone] to make an appointment with any Specialty Provider.        Urgent Care locations:    [facility] Monday-Friday 5 pm - 9 pm. Saturday-Sunday 9 am -5pm    Monday-Friday 11 am - 9 pm Saturday 9 am - 5 pm     Monday-Sunday 12 noon-8PM [phone]     If you need a medication refill, please contact us you may need lab work and/or a follow up visit prior to your refill (i.e. Antifungal medications).    MyChart (secure e-mail communication and access to your chart) to send a message or to make an appointment.    If MRI needed please call Rhys Rea at [phone]        Weight management plan: Patient was referred to their PCP to discuss a diet and exercise plan.     Patient to follow up with Primary Care provider regarding elevated blood pressure.              Follow-ups after your visit        Your next 10 appointments already scheduled     May 30, 2018  9:15 AM CDT   Return Visit with Smooth Holbrook DPM   [facility]     "[id number] [facility] [id number] [phone]              Who to contact     If you have questions or need follow up information about today's clinic visit or your schedule please contact [facility] directly at [phone].  Normal or non-critical lab and imaging results will be communicated to you by MyChart, letter or phone within 4 business days after the clinic has received the results. If you do not hear from us within 7 days, please contact the clinic through MyChart or phone. If you have a critical or abnormal lab result, we will notify you by phone as soon as possible.  Submit refill requests through [employer] or call your pharmacy and they will forward the refill request to us. Please allow 3 business days for your refill to be completed.          Additional Information About Your Visit        Care EveryWhere ID     This is your Care EveryWhere ID. This could be used by other organizations to access your [facility] medical records  [id number]        Your Vitals Were     Pulse Height BMI (Body Mass Index)             70 5' 5\" (1.651 m) 30.95 kg/m2          Blood Pressure from Last 3 Encounters:   05/30/18 150/70   10/19/17 136/68   02/20/17 120/70    Weight from Last 3 Encounters:   05/30/18 186 lb (84.4 kg)   10/19/17 185 lb (83.9 kg)   02/20/17 186 lb (84.4 kg)              Today, you had the following     No orders found for display       Primary Care Provider Office Phone # Fax #    Geovanni Vincent [phone]       [address] [facility] [address]        Equal Access to Services     [facility]: Hadii aad ku hadasho Soomaali, waaxda luqadaha, qaybta kaalmada willie, kali rudolph. So [facility] [phone].    ATENCIÓN: Si habla español, tiene a calderon disposición servicios gratuitos de asistencia lingüística. Llame al [phone].    We comply with applicable federal civil rights laws and [place] laws. We do not discriminate " on the basis of race, color, national origin, age, disability, sex, sexual orientation, or gender identity.            Thank you!     Thank you for choosing [facility]  for your care. Our goal is always to provide you with excellent care. Hearing back from our patients is one way we can continue to improve our services. Please take a few minutes to complete the written survey that you may receive in the mail after your visit with us. Thank you!             Your Updated Medication List - Protect others around you: Learn how to safely use, store and throw away your medicines at www.disposemymeds.org.          This list is accurate as of 5/30/18  9:11 AM.  Always use your most recent med list.                   Brand Name Dispense Instructions for use Diagnosis    amLODIPine 10 MG tablet    NORVASC    90 tablet    TAKE 1 TABLET BY MOUTH ONCE DAILY    Essential hypertension with goal blood pressure less than 140/90       COMBIGAN 0.2-0.5 % ophthalmic solution   Generic drug:  brimonidine-timolol      1 drop 2 times daily.        folic acid 400 MCG tablet    FOLVITE     1 TABLET DAILY        hydrochlorothiazide 12.5 MG Tabs tablet     90 tablet    TAKE ONE TABLET BY MOUTH IN THE MORNING    Essential hypertension with goal blood pressure less than 140/90       lisinopril 5 MG tablet    PRINIVIL/ZESTRIL    90 tablet    TAKE ONE TABLET BY MOUTH IN THE MORNING    Essential hypertension with goal blood pressure less than 140/90       methotrexate sodium 2.5 MG Tabs      4 tablets weekly        potassium chloride 10 MEQ tablet    K-TAB,KLOR-CON    90 tablet    TAKE 1 TABLET BY MOUTH ONCE DAILY    Hypokalemia, Essential hypertension with goal blood pressure less than 140/90       predniSONE 5 MG tablet    DELTASONE     5 mg        PRESERVISION AREDS PO      1 tablet twice daily        traMADol 50 MG tablet    ULTRAM     1 TABLET EVERY 4 TO 6 HOURS AS NEEDED        XALATAN 0.005 % ophthalmic solution   Generic  drug:  latanoprost      Nightly

## 2018-11-15 DIAGNOSIS — I10 ESSENTIAL HYPERTENSION WITH GOAL BLOOD PRESSURE LESS THAN 140/90: ICD-10-CM

## 2018-11-16 RX ORDER — LISINOPRIL 5 MG/1
TABLET ORAL
Qty: 30 TABLET | Refills: 0 | Status: SHIPPED | OUTPATIENT
Start: 2018-11-16 | End: 2018-12-12

## 2018-11-16 RX ORDER — HYDROCHLOROTHIAZIDE 12.5 MG/1
TABLET ORAL
Qty: 30 TABLET | Refills: 0 | Status: SHIPPED | OUTPATIENT
Start: 2018-11-16 | End: 2018-12-12

## 2018-11-16 NOTE — TELEPHONE ENCOUNTER
Medication is being filled for 1 time refill only due to:  Patient needs to be seen for fasting lab appointment and appointment with the provider for further refills. Hypertension.  Estela MALDONADON, RN

## 2018-12-12 DIAGNOSIS — I10 ESSENTIAL HYPERTENSION WITH GOAL BLOOD PRESSURE LESS THAN 140/90: ICD-10-CM

## 2018-12-12 DIAGNOSIS — E87.6 HYPOKALEMIA: ICD-10-CM

## 2018-12-12 RX ORDER — LISINOPRIL 5 MG/1
TABLET ORAL
Qty: 30 TABLET | Refills: 0 | Status: SHIPPED | OUTPATIENT
Start: 2018-12-12 | End: 2019-01-21

## 2018-12-12 RX ORDER — POTASSIUM CHLORIDE 750 MG/1
TABLET, EXTENDED RELEASE ORAL
Qty: 30 TABLET | Refills: 0 | Status: SHIPPED | OUTPATIENT
Start: 2018-12-12 | End: 2019-01-21

## 2018-12-12 RX ORDER — HYDROCHLOROTHIAZIDE 12.5 MG/1
12.5 TABLET ORAL EVERY MORNING
Qty: 30 TABLET | Refills: 0 | Status: SHIPPED | OUTPATIENT
Start: 2018-12-12 | End: 2019-01-21

## 2018-12-13 NOTE — TELEPHONE ENCOUNTER
Routing refill request to provider for review/approval because:  Debbi given x1 and patient did not follow up, please advise  Estela Garnica BSN, RN

## 2019-01-10 ENCOUNTER — TRANSFERRED RECORDS (OUTPATIENT)
Dept: HEALTH INFORMATION MANAGEMENT | Facility: CLINIC | Age: 79
End: 2019-01-10

## 2019-01-21 ENCOUNTER — TELEPHONE (OUTPATIENT)
Dept: FAMILY MEDICINE | Facility: CLINIC | Age: 79
End: 2019-01-21

## 2019-01-21 DIAGNOSIS — I10 ESSENTIAL HYPERTENSION WITH GOAL BLOOD PRESSURE LESS THAN 140/90: ICD-10-CM

## 2019-01-21 DIAGNOSIS — E87.6 HYPOKALEMIA: ICD-10-CM

## 2019-01-21 RX ORDER — LISINOPRIL 5 MG/1
5 TABLET ORAL EVERY MORNING
Qty: 30 TABLET | Refills: 0 | Status: SHIPPED | OUTPATIENT
Start: 2019-01-21 | End: 2019-02-12

## 2019-01-21 RX ORDER — POTASSIUM CHLORIDE 750 MG/1
10 TABLET, EXTENDED RELEASE ORAL DAILY
Qty: 30 TABLET | Refills: 0 | Status: SHIPPED | OUTPATIENT
Start: 2019-01-21 | End: 2019-02-12

## 2019-01-21 RX ORDER — HYDROCHLOROTHIAZIDE 12.5 MG/1
12.5 TABLET ORAL EVERY MORNING
Qty: 30 TABLET | Refills: 0 | Status: SHIPPED | OUTPATIENT
Start: 2019-01-21 | End: 2019-02-12

## 2019-02-05 DIAGNOSIS — E87.6 HYPOKALEMIA: ICD-10-CM

## 2019-02-05 DIAGNOSIS — Z13.6 CARDIOVASCULAR SCREENING; LDL GOAL LESS THAN 160: ICD-10-CM

## 2019-02-05 DIAGNOSIS — I10 ESSENTIAL HYPERTENSION WITH GOAL BLOOD PRESSURE LESS THAN 140/90: ICD-10-CM

## 2019-02-05 DIAGNOSIS — N18.2 ANEMIA OF CHRONIC RENAL FAILURE, STAGE 2 (MILD): ICD-10-CM

## 2019-02-05 DIAGNOSIS — I10 HYPERTENSION GOAL BP (BLOOD PRESSURE) < 140/90: ICD-10-CM

## 2019-02-05 DIAGNOSIS — N18.2 CHRONIC RENAL INSUFFICIENCY, STAGE II (MILD): ICD-10-CM

## 2019-02-05 DIAGNOSIS — D63.1 ANEMIA OF CHRONIC RENAL FAILURE, STAGE 2 (MILD): ICD-10-CM

## 2019-02-05 LAB
ALBUMIN SERPL-MCNC: 4 G/DL (ref 3.4–5)
ANION GAP SERPL CALCULATED.3IONS-SCNC: 6 MMOL/L (ref 3–14)
BASOPHILS # BLD AUTO: 0 10E9/L (ref 0–0.2)
BASOPHILS NFR BLD AUTO: 0.2 %
BUN SERPL-MCNC: 21 MG/DL (ref 7–30)
CALCIUM SERPL-MCNC: 9.1 MG/DL (ref 8.5–10.1)
CHLORIDE SERPL-SCNC: 103 MMOL/L (ref 94–109)
CHOLEST SERPL-MCNC: 165 MG/DL
CO2 SERPL-SCNC: 30 MMOL/L (ref 20–32)
CREAT SERPL-MCNC: 1.38 MG/DL (ref 0.66–1.25)
DIFFERENTIAL METHOD BLD: ABNORMAL
EOSINOPHIL # BLD AUTO: 0.2 10E9/L (ref 0–0.7)
EOSINOPHIL NFR BLD AUTO: 3.2 %
ERYTHROCYTE [DISTWIDTH] IN BLOOD BY AUTOMATED COUNT: 15 % (ref 10–15)
GFR SERPL CREATININE-BSD FRML MDRD: 48 ML/MIN/{1.73_M2}
GLUCOSE SERPL-MCNC: 97 MG/DL (ref 70–99)
HCT VFR BLD AUTO: 42 % (ref 40–53)
HDLC SERPL-MCNC: 28 MG/DL
HGB BLD-MCNC: 14.4 G/DL (ref 13.3–17.7)
LDLC SERPL CALC-MCNC: 74 MG/DL
LYMPHOCYTES # BLD AUTO: 1.4 10E9/L (ref 0.8–5.3)
LYMPHOCYTES NFR BLD AUTO: 23.9 %
MCH RBC QN AUTO: 33.3 PG (ref 26.5–33)
MCHC RBC AUTO-ENTMCNC: 34.3 G/DL (ref 31.5–36.5)
MCV RBC AUTO: 97 FL (ref 78–100)
MONOCYTES # BLD AUTO: 1.1 10E9/L (ref 0–1.3)
MONOCYTES NFR BLD AUTO: 17.9 %
NEUTROPHILS # BLD AUTO: 3.2 10E9/L (ref 1.6–8.3)
NEUTROPHILS NFR BLD AUTO: 54.8 %
NONHDLC SERPL-MCNC: 137 MG/DL
PHOSPHATE SERPL-MCNC: 3 MG/DL (ref 2.5–4.5)
PLATELET # BLD AUTO: 138 10E9/L (ref 150–450)
POTASSIUM SERPL-SCNC: 3.7 MMOL/L (ref 3.4–5.3)
RBC # BLD AUTO: 4.33 10E12/L (ref 4.4–5.9)
SODIUM SERPL-SCNC: 139 MMOL/L (ref 133–144)
TRIGL SERPL-MCNC: 314 MG/DL
WBC # BLD AUTO: 5.9 10E9/L (ref 4–11)

## 2019-02-05 PROCEDURE — 36415 COLL VENOUS BLD VENIPUNCTURE: CPT | Performed by: FAMILY MEDICINE

## 2019-02-05 PROCEDURE — 80061 LIPID PANEL: CPT | Performed by: FAMILY MEDICINE

## 2019-02-05 PROCEDURE — 80069 RENAL FUNCTION PANEL: CPT | Performed by: FAMILY MEDICINE

## 2019-02-05 PROCEDURE — 85025 COMPLETE CBC W/AUTO DIFF WBC: CPT | Performed by: FAMILY MEDICINE

## 2019-02-12 ENCOUNTER — OFFICE VISIT (OUTPATIENT)
Dept: FAMILY MEDICINE | Facility: CLINIC | Age: 79
End: 2019-02-12
Payer: COMMERCIAL

## 2019-02-12 VITALS
HEIGHT: 65 IN | DIASTOLIC BLOOD PRESSURE: 79 MMHG | SYSTOLIC BLOOD PRESSURE: 144 MMHG | WEIGHT: 180 LBS | RESPIRATION RATE: 20 BRPM | OXYGEN SATURATION: 97 % | BODY MASS INDEX: 29.99 KG/M2 | TEMPERATURE: 97.7 F | HEART RATE: 73 BPM

## 2019-02-12 DIAGNOSIS — I10 ESSENTIAL HYPERTENSION WITH GOAL BLOOD PRESSURE LESS THAN 140/90: ICD-10-CM

## 2019-02-12 DIAGNOSIS — E87.6 HYPOKALEMIA: ICD-10-CM

## 2019-02-12 PROCEDURE — 99214 OFFICE O/P EST MOD 30 MIN: CPT | Performed by: FAMILY MEDICINE

## 2019-02-12 RX ORDER — LISINOPRIL/HYDROCHLOROTHIAZIDE 10-12.5 MG
1 TABLET ORAL DAILY
Qty: 90 TABLET | Refills: 1 | Status: SHIPPED | OUTPATIENT
Start: 2019-02-12 | End: 2019-08-08

## 2019-02-12 RX ORDER — POTASSIUM CHLORIDE 750 MG/1
10 TABLET, EXTENDED RELEASE ORAL DAILY
Qty: 90 TABLET | Refills: 3 | Status: SHIPPED | OUTPATIENT
Start: 2019-02-12 | End: 2020-02-19

## 2019-02-12 ASSESSMENT — MIFFLIN-ST. JEOR: SCORE: 1458.35

## 2019-02-12 NOTE — NURSING NOTE
"Chief Complaint   Patient presents with     Hypertension     shingles. pneu       Initial /70   Pulse 73   Temp 97.7  F (36.5  C) (Oral)   Resp 20   Ht 1.651 m (5' 5\")   Wt 81.6 kg (180 lb)   SpO2 97%   BMI 29.95 kg/m   Estimated body mass index is 29.95 kg/m  as calculated from the following:    Height as of this encounter: 1.651 m (5' 5\").    Weight as of this encounter: 81.6 kg (180 lb).    Estela Kendrick CMA    "

## 2019-02-12 NOTE — PROGRESS NOTES
"SUBJECTIVE:  Kenton Townsend, a 78 year old male scheduled an appointment to discuss the following issues:  Follow-up htn, cri , high cholesterol and Arthritis.  Seeing rheum.   No chest pain or shortness of breath. Outside blood pressure reading ok.   No nausea, vomiting or diarrhea. No black or bloody stools. No urine changes or hematuria.   . Lives by self. Friends and family involved. 6 kids.   Taking vitamin D.   Past Medical History:   Diagnosis Date     Glaucoma      Hypertension      rheumatoid arthritis        Past Surgical History:   Procedure Laterality Date     CATARACT IOL, RT/LT      Cataract IOL RT/LT     JOINT REPLACEMENT, HIP RT/LT Left > 20 years ago    Joint Replacement Hip RT/LT     SURGICAL HISTORY OF -   13 years ago    humerus left       Family History   Problem Relation Age of Onset     Cancer Father         unknown     Cancer Brother         Hodskins      Eye Disorder Brother      Neurologic Disorder Son         vertigo       Social History     Tobacco Use     Smoking status: Former Smoker     Types: Cigarettes     Last attempt to quit: 1979     Years since quittin.2     Smokeless tobacco: Never Used   Substance Use Topics     Alcohol use: Yes     Comment: couple times a week       ROS:  All other ROS negative.     OBJECTIVE:  /79   Pulse 73   Temp 97.7  F (36.5  C) (Oral)   Resp 20   Ht 1.651 m (5' 5\")   Wt 81.6 kg (180 lb)   SpO2 97%   BMI 29.95 kg/m    EXAM:  GENERAL APPEARANCE: healthy, alert and no distress  NECK: no adenopathy, no asymmetry, masses, or scars and thyroid normal to palpation  RESP: lungs clear to auscultation - no rales, rhonchi or wheezes  CV: regular rates and rhythm, normal S1 S2, no S3 or S4 and no murmur, click or rub -  ABDOMEN:  soft, nontender, no HSM or masses and bowel sounds normal  MS: extremities normal- no gross deformities noted, no evidence of inflammation in joints, FROM in all extremities.  PSYCH: mentation appears " normal and affect normal/bright    ASSESSMENT / PLAN:  (E87.6) Hypokalemia  Plan: potassium chloride ER (K-TAB/KLOR-CON) 10 MEQ         CR tablet            (I10) Essential hypertension with goal blood pressure less than 140/90  Comment: a little high  Plan: potassium chloride ER (K-TAB/KLOR-CON) 10 MEQ         CR tablet, lisinopril-hydrochlorothiazide         (PRINZIDE/ZESTORETIC) 10-12.5 MG tablet        Will bump up lisinopril from 5 to 10mg and add with hydrochlorothiazide for combo pill. Continue exercise and monitor. Recheck in 6 months  Sooner if worse/new issues. More water. Call/email with questions/concerns    Geovanni Vincent

## 2019-05-15 ENCOUNTER — TELEPHONE (OUTPATIENT)
Dept: FAMILY MEDICINE | Facility: CLINIC | Age: 79
End: 2019-05-15

## 2019-05-15 NOTE — TELEPHONE ENCOUNTER
Panel Management Review      Patient has the following on his problem list:     Hypertension   Last three blood pressure readings:  BP Readings from Last 3 Encounters:   02/12/19 144/79   05/30/18 150/70   10/19/17 136/68     Blood pressure: FAILED    HTN Guidelines:  Less than 140/90      Composite cancer screening  Chart review shows that this patient is due/due soon for the following Colonoscopy  Summary:    Patient is due/failing the following:   BP CHECK and COLONOSCOPY    Action needed:   Patient needs referral/order: Colonoscopy    Type of outreach:    will have Dr. Vincent sign Colonoscopy order. follow 6 mos for b/p    Questions for provider review:    Please sign order.                                                                                                                                     Estela Kendrick CMA       Chart routed to Provider .

## 2019-05-16 NOTE — TELEPHONE ENCOUNTER
Patient is 78year old and not sure why he is flagging to have one? Doesn't it stop after 75yo? Ok for blood pressure/md appointment in next couple months. Geovanni Vincent MD

## 2019-08-08 DIAGNOSIS — I10 ESSENTIAL HYPERTENSION WITH GOAL BLOOD PRESSURE LESS THAN 140/90: ICD-10-CM

## 2019-08-12 RX ORDER — LISINOPRIL/HYDROCHLOROTHIAZIDE 10-12.5 MG
TABLET ORAL
Qty: 30 TABLET | Refills: 0 | Status: SHIPPED | OUTPATIENT
Start: 2019-08-12 | End: 2019-09-05

## 2019-09-05 DIAGNOSIS — I10 ESSENTIAL HYPERTENSION WITH GOAL BLOOD PRESSURE LESS THAN 140/90: ICD-10-CM

## 2019-09-05 NOTE — LETTER
September 10, 2019    Kenton Townsend  [address]  [place] [address]    Dear Kenton,       We recently received a refill request for lisinopril-hydrochlorothiazide (PRINZIDE/ZESTORETIC) 10-12.5 MG tablet.  We have refilled this for a one time 30 day supply only because you are due for a:    Blood pressure/medication check office visit with a provider and fasting lab appointment      Please schedule this lab appointment 4-5 days prior to the office visit.     Please call at your earliest convenience so that there will not be a delay with your future refills.          Thank you,   Your [facility] Team/  [phone]

## 2019-09-10 RX ORDER — LISINOPRIL/HYDROCHLOROTHIAZIDE 10-12.5 MG
TABLET ORAL
Qty: 30 TABLET | Refills: 0 | Status: SHIPPED | OUTPATIENT
Start: 2019-09-10 | End: 2019-09-26

## 2019-09-10 NOTE — TELEPHONE ENCOUNTER
"BP Readings from Last 6 Encounters:   02/12/19 144/79   05/30/18 150/70   10/19/17 136/68   02/20/17 120/70   07/21/16 139/82   12/17/15 130/70     Potassium   Date Value Ref Range Status   02/05/2019 3.7 3.4 - 5.3 mmol/L Final     Requested Prescriptions   Pending Prescriptions Disp Refills     lisinopril-hydrochlorothiazide (PRINZIDE/ZESTORETIC) 10-12.5 MG tablet [Pharmacy Med Name: LISINOPRIL-HCTZ 10-12.5 MG TAB] 30 tablet 0     Sig: TAKE 1 TABLET BY MOUTH EVERY DAY       Diuretics (Including Combos) Protocol Failed - 9/5/2019  9:36 AM        Failed - Blood pressure under 140/90 in past 12 months     BP Readings from Last 3 Encounters:   02/12/19 144/79   05/30/18 150/70   10/19/17 136/68                 Failed - Normal serum creatinine on file in past 12 months     Recent Labs   Lab Test 02/05/19  0739   CR 1.38*              Passed - Recent (12 mo) or future (30 days) visit within the authorizing provider's specialty     Patient had office visit in the last 12 months or has a visit in the next 30 days with authorizing provider or within the authorizing provider's specialty.  See \"Patient Info\" tab in inbasket, or \"Choose Columns\" in Meds & Orders section of the refill encounter.              Passed - Medication is active on med list        Passed - Patient is age 18 or older        Passed - Normal serum potassium on file in past 12 months     Recent Labs   Lab Test 02/05/19  0739   POTASSIUM 3.7                    Passed - Normal serum sodium on file in past 12 months     Recent Labs   Lab Test 02/05/19  0739                 Please advise on refill   Last office visit 2/12/19.  Ami Neil RN     "

## 2019-09-25 ENCOUNTER — DOCUMENTATION ONLY (OUTPATIENT)
Dept: LAB | Facility: CLINIC | Age: 79
End: 2019-09-25

## 2019-09-25 DIAGNOSIS — I10 HYPERTENSION GOAL BP (BLOOD PRESSURE) < 140/90: Primary | ICD-10-CM

## 2019-09-25 NOTE — PROGRESS NOTES
Lab orders pended. Please review, sign and close encounter.     Appointment scheduled 10/8/19 Hypertension.     GULSHAN Cisneros

## 2019-09-26 DIAGNOSIS — I10 ESSENTIAL HYPERTENSION WITH GOAL BLOOD PRESSURE LESS THAN 140/90: ICD-10-CM

## 2019-09-26 RX ORDER — LISINOPRIL/HYDROCHLOROTHIAZIDE 10-12.5 MG
1 TABLET ORAL DAILY
Qty: 30 TABLET | Refills: 0 | Status: SHIPPED | OUTPATIENT
Start: 2019-09-26 | End: 2020-01-15

## 2019-09-26 NOTE — TELEPHONE ENCOUNTER
Pending appointment     Next 5 appointments (look out 90 days)    Oct 08, 2019  3:20 PM CDT  Office Visit with Edwar Coelho MD  [facility] [address] [facility] [address] [phone]        Rx refilled per [facility] refill protocol.    Althea Gambino BSN, RN

## 2019-09-30 DIAGNOSIS — I10 HYPERTENSION GOAL BP (BLOOD PRESSURE) < 140/90: ICD-10-CM

## 2019-09-30 LAB
ALBUMIN SERPL-MCNC: 3.9 G/DL (ref 3.4–5)
ANION GAP SERPL CALCULATED.3IONS-SCNC: 7 MMOL/L (ref 3–14)
BUN SERPL-MCNC: 23 MG/DL (ref 7–30)
CALCIUM SERPL-MCNC: 9.2 MG/DL (ref 8.5–10.1)
CHLORIDE SERPL-SCNC: 108 MMOL/L (ref 94–109)
CO2 SERPL-SCNC: 26 MMOL/L (ref 20–32)
CREAT SERPL-MCNC: 1.45 MG/DL (ref 0.66–1.25)
GFR SERPL CREATININE-BSD FRML MDRD: 45 ML/MIN/{1.73_M2}
GLUCOSE SERPL-MCNC: 94 MG/DL (ref 70–99)
PHOSPHATE SERPL-MCNC: 2.7 MG/DL (ref 2.5–4.5)
POTASSIUM SERPL-SCNC: 4.4 MMOL/L (ref 3.4–5.3)
SODIUM SERPL-SCNC: 141 MMOL/L (ref 133–144)

## 2019-09-30 PROCEDURE — 36415 COLL VENOUS BLD VENIPUNCTURE: CPT | Performed by: FAMILY MEDICINE

## 2019-09-30 PROCEDURE — 80069 RENAL FUNCTION PANEL: CPT | Performed by: FAMILY MEDICINE

## 2019-10-07 NOTE — PROGRESS NOTES
Subjective     Kenton Townsend is a 79 year old male who presents to clinic today for the following health issues:    HPI   Hypertension Follow-up      Do you check your blood pressure regularly outside of the clinic? No     Are you following a low salt diet? No    Are your blood pressures ever more than 140 on the top number (systolic) OR more   than 90 on the bottom number (diastolic), for example 140/90? N/A      How many servings of fruits and vegetables do you eat daily?  0-1    On average, how many sweetened beverages do you drink each day (soda, juice, sweet tea, etc)?   1    How many days per week do you miss taking your medication? 0        Chronic Kidney Disease Follow-up  CKD stage III    Current NSAID use?  No     Advised patient to avoid NSAIDs.    Adequate control of blood pressure to avoid progressing of CKD  Creatinine   Date Value Ref Range Status   2019 1.45 (H) 0.66 - 1.25 mg/dL Final       Rheumatoid Arthritis:    Current medications: Prednisone 5 mg  and Methotrexate  Following with rheumatology     Cognitive screenin) Repeat 3 items (apple computer, Table)    2) Clock draw: passed  3) 3 item recall: Recalls 3 objects  Results: 3 items recalled: COGNITIVE IMPAIRMENT LESS LIKELY      Patient Active Problem List   Diagnosis     RA (rheumatoid arthritis) (H)     Glaucoma     CARDIOVASCULAR SCREENING; LDL GOAL LESS THAN 160     Vision loss, left eye     1st degree AV block     Advanced directives, counseling/discussion     Obesity     Hypertension goal BP (blood pressure) < 140/90     Anemia of chronic renal failure, stage 2 (mild)     Chronic renal insufficiency, stage II (mild)     Essential hypertension with goal blood pressure less than 140/90     Rheumatoid arthritis with positive rheumatoid factor, involving unspecified site (H)     CKD (chronic kidney disease) stage 3, GFR 30-59 ml/min (H)     Past Surgical History:   Procedure Laterality Date     CATARACT IOL, RT/LT       Cataract IOL RT/LT     JOINT REPLACEMENT, HIP RT/LT Left > 20 years ago    Joint Replacement Hip RT/LT     SURGICAL HISTORY OF -   13 years ago    humerus left       Social History     Tobacco Use     Smoking status: Former Smoker     Types: Cigarettes     Last attempt to quit: 1979     Years since quittin.9     Smokeless tobacco: Never Used   Substance Use Topics     Alcohol use: Yes     Comment: couple times a week     Family History   Problem Relation Age of Onset     Cancer Father         unknown     Cancer Brother         Hodskins      Eye Disorder Brother      Neurologic Disorder Son         vertigo         Current Outpatient Medications   Medication Sig Dispense Refill     amLODIPine (NORVASC) 10 MG tablet TAKE 1 TABLET BY MOUTH ONCE DAILY 90 tablet 0     brimonidine-timolol (COMBIGAN) 0.2-0.5 % ophthalmic solution 1 drop 2 times daily.       FOLIC ACID 400 MCG OR TABS 1 TABLET DAILY       lisinopril-hydrochlorothiazide (PRINZIDE/ZESTORETIC) 10-12.5 MG tablet Take 1 tablet by mouth daily 30 tablet 0     potassium chloride ER (K-TAB/KLOR-CON) 10 MEQ CR tablet Take 1 tablet (10 mEq) by mouth daily 90 tablet 3     predniSONE (DELTASONE) 5 MG tablet Take 5 mg by mouth daily .       PRESERVISION AREDS OR 1 tablet twice daily       TRAMADOL HCL 50 MG OR TABS 1 TABLET EVERY 4 TO 6 HOURS AS NEEDED       XALATAN 0.005 % OP SOLN Nightly       methotrexate 2.5 MG tablet Take 3 tablets weekly       Allergies   Allergen Reactions     Nkda [No Known Drug Allergies]      Recent Labs   Lab Test 19  0923 19  0739  16  1015  14  1024   LDL  --  74  --  55  --  88   HDL  --  28*  --  28*  --  23*   TRIG  --  314*  --  183*  --  216*   ALT  --   --   --   --   --  26   CR 1.45* 1.38*   < > 1.38*   < > 1.17   GFRESTIMATED 45* 48*   < > 50*   < > 61   GFRESTBLACK 52* 56*   < > 60*   < > 74   POTASSIUM 4.4 3.7   < > 4.2   < > 3.6    < > = values in this interval not displayed.      BP Readings  from Last 3 Encounters:   10/15/19 128/75   02/12/19 144/79   05/30/18 150/70    Wt Readings from Last 3 Encounters:   10/15/19 85.7 kg (189 lb)   02/12/19 81.6 kg (180 lb)   05/30/18 84.4 kg (186 lb)                    Reviewed and updated as needed this visit by Provider  Tobacco  Meds         Review of Systems   ROS COMP: Constitutional, HEENT, cardiovascular, pulmonary, GI, , musculoskeletal, neuro, skin, endocrine and psych systems are negative, except as otherwise noted.      Objective    /75   Pulse 64   Temp 97.5  F (36.4  C) (Oral)   Wt 85.7 kg (189 lb)   SpO2 96%   BMI 31.45 kg/m    Body mass index is 31.45 kg/m .  Physical Exam   GENERAL: healthy, alert and no distress  EYES: Eyes grossly normal to inspection, PERRL and conjunctivae and sclerae normal  HENT: ear canals and TM's normal, nose and mouth without ulcers or lesions  NECK: no adenopathy, no asymmetry, masses, or scars and thyroid normal to palpation  RESP: lungs clear to auscultation - no rales, rhonchi or wheezes  CV: regular rate and rhythm, normal S1 S2, no S3 or S4, no murmur, click or rub, no peripheral edema and peripheral pulses strong  ABDOMEN: soft, nontender, no hepatosplenomegaly, no masses and bowel sounds normal  MS: no gross musculoskeletal defects noted, no edema  SKIN: no suspicious lesions or rashes  NEURO: Normal strength and tone, mentation intact and speech normal  PSYCH: mentation appears normal, affect normal/bright            Assessment & Plan     1. Hypertension goal BP (blood pressure) < 140/90  Patient with history of hypertension.  Blood pressure is well controlled.  He is currently on lisinopril/chlorothiazide and Norvasc.  Patient is compliant with medications.  Initial blood pressure 157/75, repeat 128/75 patient use salt his food  Discussed low-salt diet and exercise.    2. CKD (chronic kidney disease) stage 3, GFR 30-59 ml/min (H)  Advised patient to avoid NSAIDs.  Adequate control of blood pressure  to avoid progressing of CKD  Chronic problem.  Stable  3. Rheumatoid arthritis with positive rheumatoid factor, involving unspecified site (H)  She with history of rheumatoid arthritis.  Chronic problem.  Stable  Per patient his rheumatologist changed his methotrexate to 7.5 mg weekly(2.5 mg take 3 tablets)  Currently taking prednisone 5 mg and methotrexate 7.5 mg weekly.  I change methotrexate dose in the chart to match what he reported.  Continue current medications.  Patient verbalized understanding and agreed on the plan of care.  All questions answered.  DELMY completed      Return in about 6 months (around 4/15/2020) for Routine Visit.    Edwar Coelho MD  [facility]

## 2019-10-15 ENCOUNTER — OFFICE VISIT (OUTPATIENT)
Dept: FAMILY MEDICINE | Facility: CLINIC | Age: 79
End: 2019-10-15
Payer: COMMERCIAL

## 2019-10-15 VITALS
WEIGHT: 189 LBS | BODY MASS INDEX: 31.45 KG/M2 | HEART RATE: 64 BPM | SYSTOLIC BLOOD PRESSURE: 128 MMHG | TEMPERATURE: 97.5 F | DIASTOLIC BLOOD PRESSURE: 75 MMHG | OXYGEN SATURATION: 96 %

## 2019-10-15 DIAGNOSIS — M05.9 RHEUMATOID ARTHRITIS WITH POSITIVE RHEUMATOID FACTOR, INVOLVING UNSPECIFIED SITE (H): ICD-10-CM

## 2019-10-15 DIAGNOSIS — I10 HYPERTENSION GOAL BP (BLOOD PRESSURE) < 140/90: Primary | ICD-10-CM

## 2019-10-15 DIAGNOSIS — N18.30 CKD (CHRONIC KIDNEY DISEASE) STAGE 3, GFR 30-59 ML/MIN (H): ICD-10-CM

## 2019-10-15 PROCEDURE — 99214 OFFICE O/P EST MOD 30 MIN: CPT | Performed by: FAMILY MEDICINE

## 2019-10-15 PROCEDURE — 99207 C PAF COMPLETED  NO CHARGE: CPT | Performed by: FAMILY MEDICINE

## 2019-10-17 DIAGNOSIS — I10 ESSENTIAL HYPERTENSION WITH GOAL BLOOD PRESSURE LESS THAN 140/90: ICD-10-CM

## 2019-10-17 RX ORDER — LISINOPRIL/HYDROCHLOROTHIAZIDE 10-12.5 MG
TABLET ORAL
Qty: 90 TABLET | Refills: 1 | Status: SHIPPED | OUTPATIENT
Start: 2019-10-17 | End: 2020-03-27

## 2020-01-15 ENCOUNTER — OFFICE VISIT (OUTPATIENT)
Dept: PODIATRY | Facility: CLINIC | Age: 80
End: 2020-01-15
Payer: COMMERCIAL

## 2020-01-15 VITALS
DIASTOLIC BLOOD PRESSURE: 70 MMHG | BODY MASS INDEX: 30.95 KG/M2 | WEIGHT: 186 LBS | HEART RATE: 76 BPM | SYSTOLIC BLOOD PRESSURE: 135 MMHG

## 2020-01-15 DIAGNOSIS — L84 CALLUS: ICD-10-CM

## 2020-01-15 DIAGNOSIS — R23.4 ESCHAR OF FOOT: Primary | ICD-10-CM

## 2020-01-15 DIAGNOSIS — N18.30 CKD (CHRONIC KIDNEY DISEASE) STAGE 3, GFR 30-59 ML/MIN (H): ICD-10-CM

## 2020-01-15 DIAGNOSIS — M20.42 HAMMER TOE OF LEFT FOOT: ICD-10-CM

## 2020-01-15 DIAGNOSIS — M21.619 BUNION: ICD-10-CM

## 2020-01-15 PROCEDURE — 99214 OFFICE O/P EST MOD 30 MIN: CPT | Performed by: PODIATRIST

## 2020-01-15 NOTE — PROGRESS NOTES
Subjective:    Pt is seen today for pain in his left foot.  Has had this for 4 months.  He describes it as a burning pain.  Is aggravated by activity and relieved by rest.  He points the lateral portion of his hallux as to where it hurts.  He is retired.  He has a history of rheumatoid arthritis.  He also has chronic kidney disease stage III.  He is a former smoker.  Both his father and brother had cancer.      ROS:  A 10-point review of systems was performed and is positive for that noted in the HPI and as seen above.  All other areas are negative.          Allergies   Allergen Reactions     Nkda [No Known Drug Allergies]        Current Outpatient Medications   Medication Sig Dispense Refill     amLODIPine (NORVASC) 10 MG tablet TAKE 1 TABLET BY MOUTH ONCE DAILY 90 tablet 0     brimonidine-timolol (COMBIGAN) 0.2-0.5 % ophthalmic solution 1 drop 2 times daily.       FOLIC ACID 400 MCG OR TABS 1 TABLET DAILY       lisinopril-hydrochlorothiazide (PRINZIDE/ZESTORETIC) 10-12.5 MG tablet TAKE 1 TABLET BY MOUTH EVERY DAY 90 tablet 1     methotrexate 2.5 MG tablet Take 3 tablets weekly       potassium chloride ER (K-TAB/KLOR-CON) 10 MEQ CR tablet Take 1 tablet (10 mEq) by mouth daily 90 tablet 3     predniSONE (DELTASONE) 5 MG tablet Take 5 mg by mouth daily .       PRESERVISION AREDS OR 1 tablet twice daily       TRAMADOL HCL 50 MG OR TABS 1 TABLET EVERY 4 TO 6 HOURS AS NEEDED       XALATAN 0.005 % OP SOLN Nightly         Patient Active Problem List   Diagnosis     RA (rheumatoid arthritis) (H)     Glaucoma     CARDIOVASCULAR SCREENING; LDL GOAL LESS THAN 160     Vision loss, left eye     1st degree AV block     Advanced directives, counseling/discussion     Obesity     Hypertension goal BP (blood pressure) < 140/90     Anemia of chronic renal failure, stage 2 (mild)     Chronic renal insufficiency, stage II (mild)     Essential hypertension with goal blood pressure less than 140/90     Rheumatoid arthritis with positive  rheumatoid factor, involving unspecified site (H)     CKD (chronic kidney disease) stage 3, GFR 30-59 ml/min (H)       Past Medical History:   Diagnosis Date     Glaucoma      Hypertension      rheumatoid arthritis        Past Surgical History:   Procedure Laterality Date     CATARACT IOL, RT/LT      Cataract IOL RT/LT     JOINT REPLACEMENT, HIP RT/LT Left > 20 years ago    Joint Replacement Hip RT/LT     SURGICAL HISTORY OF -   13 years ago    humerus left       Family History   Problem Relation Age of Onset     Cancer Father         unknown     Cancer Brother         Hodskins      Eye Disorder Brother      Neurologic Disorder Son         vertigo       Social History     Tobacco Use     Smoking status: Former Smoker     Types: Cigarettes     Last attempt to quit: 1979     Years since quittin.1     Smokeless tobacco: Never Used   Substance Use Topics     Alcohol use: Yes     Comment: couple times a week         Exam:    Vitals: /70   Pulse 76   Wt 84.4 kg (186 lb)   BMI 30.95 kg/m    BMI: Body mass index is 30.95 kg/m .  Height: Data Unavailable    Constitutional/ general:  Pt is in no apparent distress, appears well-nourished.  Cooperative with history and physical exam.     Psych:  The patient answered questions appropriately.  Normal affect.  Seems to have reasonable expectations, in terms of treatment.     Eyes:  Visual scanning/ tracking without deficit.     Ears:  Response to auditory stimuli is normal.   Auricles in proper alignment.     Lymphatic:  Popliteal lymph nodes not enlarged.     Lungs:  Non labored breathing, non labored speech. No cough.  No audible wheezing. Even, quiet breathing.       Vascular:  negative PT arteries.  DP 2/4.  CFT < 3 sec.   Ankle edema and varicosities noted.  No pedal hair growth.    Neuro:  Alert and oriented x 3. Coordinated gait.  Light touch sensation is intact to the L4, L5, S1 distributions. No obvious deficits.  No evidence of neurological-based  weakness, spasticity, or contracture in the lower extremities.  Monofilament intact on all digits    Derm: skin thin, shiny, atrophic.  No erythema, ecchymosis, or cyanosis.      Musculoskeletal:    Lower extremity muscle strength is normal.  Patient is ambulatory without an assistive device or brace.  No gross deformities.  Normal arch.   MS 5/5 all compartments.  All the patient's forefoot joints are somewhat stiff.  He has a bunion deformity.  His left second toe is elevated.  There is some loose skin with an underlying eschar on the lateral portion of his left big toe.  There is no diogo ulceration.  There is underlying prominent bone.      A/P  CKD stage 3  RA  Eschar left hallux  Bunion deformity    Explained to patient how his bunion deformity is causing pressure on the center space resulting in an eschar.  He will make sure shoes are wide enough.  We gave him a silicone  toe cushion to try.  He will try a toe .  We gave him a Budin splint to reduce his second toe to see if this helps offload it.  We also discussed silicone wedge and he will look for these.  He will return the clinic if not improved otherwise as needed    Smooth Holbrook, VISHNU DPVIDAL, FACFAS

## 2020-01-15 NOTE — LETTER
1/15/2020         RE: Kenton Townsend  [address]  [place] [address]        Dear Colleague,    Thank you for referring your patient, Kenton Townsend, to the [facility]. Please see a copy of my visit note below.    Subjective:    Pt is seen today for pain in his left foot.  Has had this for 4 months.  He describes it as a burning pain.  Is aggravated by activity and relieved by rest.  He points the lateral portion of his hallux as to where it hurts.  He is retired.  He has a history of rheumatoid arthritis.  He also has chronic kidney disease stage III.  He is a former smoker.  Both his father and brother had cancer.      ROS:  A 10-point review of systems was performed and is positive for that noted in the HPI and as seen above.  All other areas are negative.          Allergies   Allergen Reactions     Nkda [No Known Drug Allergies]        Current Outpatient Medications   Medication Sig Dispense Refill     amLODIPine (NORVASC) 10 MG tablet TAKE 1 TABLET BY MOUTH ONCE DAILY 90 tablet 0     brimonidine-timolol (COMBIGAN) 0.2-0.5 % ophthalmic solution 1 drop 2 times daily.       FOLIC ACID 400 MCG OR TABS 1 TABLET DAILY       lisinopril-hydrochlorothiazide (PRINZIDE/ZESTORETIC) 10-12.5 MG tablet TAKE 1 TABLET BY MOUTH EVERY DAY 90 tablet 1     methotrexate 2.5 MG tablet Take 3 tablets weekly       potassium chloride ER (K-TAB/KLOR-CON) 10 MEQ CR tablet Take 1 tablet (10 mEq) by mouth daily 90 tablet 3     predniSONE (DELTASONE) 5 MG tablet Take 5 mg by mouth daily .       PRESERVISION AREDS OR 1 tablet twice daily       TRAMADOL HCL 50 MG OR TABS 1 TABLET EVERY 4 TO 6 HOURS AS NEEDED       XALATAN 0.005 % OP SOLN Nightly         Patient Active Problem List   Diagnosis     RA (rheumatoid arthritis) (H)     Glaucoma     CARDIOVASCULAR SCREENING; LDL GOAL LESS THAN 160     Vision loss, left eye     1st degree AV block     Advanced directives, counseling/discussion     Obesity      Hypertension goal BP (blood pressure) < 140/90     Anemia of chronic renal failure, stage 2 (mild)     Chronic renal insufficiency, stage II (mild)     Essential hypertension with goal blood pressure less than 140/90     Rheumatoid arthritis with positive rheumatoid factor, involving unspecified site (H)     CKD (chronic kidney disease) stage 3, GFR 30-59 ml/min (H)       Past Medical History:   Diagnosis Date     Glaucoma      Hypertension      rheumatoid arthritis        Past Surgical History:   Procedure Laterality Date     CATARACT IOL, RT/LT      Cataract IOL RT/LT     JOINT REPLACEMENT, HIP RT/LT Left > 20 years ago    Joint Replacement Hip RT/LT     SURGICAL HISTORY OF -   13 years ago    humerus left       Family History   Problem Relation Age of Onset     Cancer Father         unknown     Cancer Brother         Hodskins      Eye Disorder Brother      Neurologic Disorder Son         vertigo       Social History     Tobacco Use     Smoking status: Former Smoker     Types: Cigarettes     Last attempt to quit: 1979     Years since quittin.1     Smokeless tobacco: Never Used   Substance Use Topics     Alcohol use: Yes     Comment: couple times a week         Exam:    Vitals: /70   Pulse 76   Wt 84.4 kg (186 lb)   BMI 30.95 kg/m     BMI: Body mass index is 30.95 kg/m .  Height: Data Unavailable    Constitutional/ general:  Pt is in no apparent distress, appears well-nourished.  Cooperative with history and physical exam.     Psych:  The patient answered questions appropriately.  Normal affect.  Seems to have reasonable expectations, in terms of treatment.     Eyes:  Visual scanning/ tracking without deficit.     Ears:  Response to auditory stimuli is normal.   Auricles in proper alignment.     Lymphatic:  Popliteal lymph nodes not enlarged.     Lungs:  Non labored breathing, non labored speech. No cough.  No audible wheezing. Even, quiet breathing.       Vascular:  negative PT arteries.   DP 2/4.  CFT < 3 sec.   Ankle edema and varicosities noted.  No pedal hair growth.    Neuro:  Alert and oriented x 3. Coordinated gait.  Light touch sensation is intact to the L4, L5, S1 distributions. No obvious deficits.  No evidence of neurological-based weakness, spasticity, or contracture in the lower extremities.  Monofilament intact on all digits    Derm: skin thin, shiny, atrophic.  No erythema, ecchymosis, or cyanosis.      Musculoskeletal:    Lower extremity muscle strength is normal.  Patient is ambulatory without an assistive device or brace.  No gross deformities.  Normal arch.   MS 5/5 all compartments.  All the patient's forefoot joints are somewhat stiff.  He has a bunion deformity.  His left second toe is elevated.  There is some loose skin with an underlying eschar on the lateral portion of his left big toe.  There is no diogo ulceration.  There is underlying prominent bone.      A/P  CKD stage 3  RA  Eschar left hallux  Bunion deformity    Explained to patient how his bunion deformity is causing pressure on the center space resulting in an eschar.  He will make sure shoes are wide enough.  We gave him a silicone  toe cushion to try.  He will try a toe .  We gave him a Budin splint to reduce his second toe to see if this helps offload it.  We also discussed silicone wedge and he will look for these.  He will return the clinic if not improved otherwise as needed    Smooth Holbrook DPM DPM, FACFAS             Again, thank you for allowing me to participate in the care of your patient.        Sincerely,        Smooth Holborok DPM

## 2020-01-15 NOTE — PATIENT INSTRUCTIONS
Weight management plan: Patient was referred to their PCP to discuss a diet and exercise plan.  We wish you continued good healing. If you have any questions or concerns, please do not hesitate to contact us at [phone]    Please remember to call and schedule a follow up appointment if one was recommended at your earliest convenience.   PODIATRY CLINIC HOURS  TELEPHONE NUMBER    Dr. Smooth Holbrook D.P.[facility]    Clinics:  [facility]    [facility]   Tuesday 1PM-6PM  [place]  Wednesday 7AM-2PM  [facility]  Thursday 10AM-6PM  [place]  Friday 7AM-3PM  [place]  Specialty schedulers:   [phone] to make an appointment with any Specialty Provider.        Urgent Care locations:    [facility] Monday-Friday 5 pm - 9 pm. Saturday-Sunday 9 am -5pm    Monday-Friday 11 am - 9 pm Saturday 9 am - 5 pm     Monday-Sunday 12 noon-8PM [phone]     If you need a medication refill, please contact us you may need lab work and/or a follow up visit prior to your refill (i.e. Antifungal medications).    [facility] (secure e-mail communication and access to your chart) to send a message or to make an appointment.    If MRI needed please call [facility] Re[facility] at [phone]

## 2020-02-19 DIAGNOSIS — E87.6 HYPOKALEMIA: ICD-10-CM

## 2020-02-19 DIAGNOSIS — I10 ESSENTIAL HYPERTENSION WITH GOAL BLOOD PRESSURE LESS THAN 140/90: ICD-10-CM

## 2020-02-19 RX ORDER — POTASSIUM CHLORIDE 750 MG/1
TABLET, EXTENDED RELEASE ORAL
Qty: 90 TABLET | Refills: 0 | Status: SHIPPED | OUTPATIENT
Start: 2020-02-19 | End: 2020-05-20

## 2020-02-19 NOTE — TELEPHONE ENCOUNTER
"Requested Prescriptions   Signed Prescriptions Disp Refills    POTASSIUM CHLORIDE ER 10 MEQ PO CR tablet 90 tablet 0     Sig: TAKE 1 TABLET BY MOUTH EVERY DAY       Potassium Supplements Protocol Passed - 2/19/2020  3:22 AM        Passed - Recent (12 mo) or future (30 days) visit within the authorizing provider's department     Patient has had an office visit with the authorizing provider or a provider within the authorizing providers department within the previous 12 mos or has a future within next 30 days. See \"Patient Info\" tab in inbasket, or \"Choose Columns\" in Meds & Orders section of the refill encounter.              Passed - Medication is active on med list        Passed - Patient is age 18 or older        Passed - Normal serum potassium in past 12 months     Recent Labs   Lab Test 09/30/19  0923   POTASSIUM 4.4                      "

## 2020-03-11 ENCOUNTER — TRANSFERRED RECORDS (OUTPATIENT)
Dept: HEALTH INFORMATION MANAGEMENT | Facility: CLINIC | Age: 80
End: 2020-03-11

## 2020-03-27 DIAGNOSIS — I10 ESSENTIAL HYPERTENSION WITH GOAL BLOOD PRESSURE LESS THAN 140/90: ICD-10-CM

## 2020-03-27 RX ORDER — LISINOPRIL/HYDROCHLOROTHIAZIDE 10-12.5 MG
1 TABLET ORAL DAILY
Qty: 90 TABLET | Refills: 0 | Status: SHIPPED | OUTPATIENT
Start: 2020-03-27 | End: 2020-06-29

## 2020-03-27 NOTE — TELEPHONE ENCOUNTER
"No appointment pending at this time.  Routing to provider to advise.    Althea SORENSON, RN    Requested Prescriptions   Pending Prescriptions Disp Refills     lisinopril-hydrochlorothiazide (ZESTORETIC) 10-12.5 MG tablet 90 tablet 1     Sig: Take 1 tablet by mouth daily       Diuretics (Including Combos) Protocol Failed - 3/27/2020  9:35 AM        Failed - Normal serum creatinine on file in past 12 months     Recent Labs   Lab Test 09/30/19  0923   CR 1.45*              Passed - Blood pressure under 140/90 in past 12 months     BP Readings from Last 3 Encounters:   01/15/20 135/70   10/15/19 128/75   02/12/19 144/79                 Passed - Recent (12 mo) or future (30 days) visit within the authorizing provider's specialty     Patient has had an office visit with the authorizing provider or a provider within the authorizing providers department within the previous 12 mos or has a future within next 30 days. See \"Patient Info\" tab in inbasket, or \"Choose Columns\" in Meds & Orders section of the refill encounter.              Passed - Medication is active on med list        Passed - Patient is age 18 or older        Passed - Normal serum potassium on file in past 12 months     Recent Labs   Lab Test 09/30/19  0923   POTASSIUM 4.4                    Passed - Normal serum sodium on file in past 12 months     Recent Labs   Lab Test 09/30/19  0923                ACE Inhibitors (Including Combos) Protocol Failed - 3/27/2020  9:35 AM        Failed - Normal serum creatinine on file in past 12 months     Recent Labs   Lab Test 09/30/19  0923   CR 1.45*       Ok to refill medication if creatinine is low          Passed - Blood pressure under 140/90 in past 12 months     BP Readings from Last 3 Encounters:   01/15/20 135/70   10/15/19 128/75   02/12/19 144/79                 Passed - Recent (12 mo) or future (30 days) visit within the authorizing provider's specialty     Patient has had an office visit with the " "authorizing provider or a provider within the authorizing providers department within the previous 12 mos or has a future within next 30 days. See \"Patient Info\" tab in inbasket, or \"Choose Columns\" in Meds & Orders section of the refill encounter.              Passed - Medication is active on med list        Passed - Patient is age 18 or older        Passed - Normal serum potassium on file in past 12 months     Recent Labs   Lab Test 09/30/19  0923   POTASSIUM 4.4                  "

## 2020-06-25 ENCOUNTER — TRANSFERRED RECORDS (OUTPATIENT)
Dept: HEALTH INFORMATION MANAGEMENT | Facility: CLINIC | Age: 80
End: 2020-06-25

## 2020-06-25 DIAGNOSIS — I10 ESSENTIAL HYPERTENSION WITH GOAL BLOOD PRESSURE LESS THAN 140/90: ICD-10-CM

## 2020-06-25 NOTE — LETTER
June 29, 2020    Kenton Townsend  [address]  [place] [address]    Dear Kenton,       We recently received a refill request for lisinopril-hydrochlorothiazide (ZESTORETIC) 10-12.5 MG tablet .  We have refilled this for a one time 90 day supply only because you are due for a:    Blood pressure/medication check office visit      Please call at your earliest convenience so that there will not be a delay with your future refills.          Thank you,   Your [facility] Team/  [phone]

## 2020-06-29 RX ORDER — LISINOPRIL/HYDROCHLOROTHIAZIDE 10-12.5 MG
TABLET ORAL
Qty: 90 TABLET | Refills: 0 | Status: SHIPPED | OUTPATIENT
Start: 2020-06-29 | End: 2020-10-16

## 2020-06-29 NOTE — TELEPHONE ENCOUNTER
Routing refill request to provider for review/approval because:  Labs not current: labs not current. Nichelle Aguayo RN      Diuretics (Including Combos) Protocol Failed        Normal serum creatinine on file in past 12 months         Recent Labs   Lab Test 09/30/19  0923   CR 1.45*

## 2020-07-13 ENCOUNTER — DOCUMENTATION ONLY (OUTPATIENT)
Dept: LAB | Facility: CLINIC | Age: 80
End: 2020-07-13

## 2020-07-13 DIAGNOSIS — Z13.6 CARDIOVASCULAR SCREENING; LDL GOAL LESS THAN 160: Primary | ICD-10-CM

## 2020-07-13 DIAGNOSIS — I10 ESSENTIAL HYPERTENSION WITH GOAL BLOOD PRESSURE LESS THAN 140/90: ICD-10-CM

## 2020-07-14 NOTE — PROGRESS NOTES
Please review lab orders sign and close encounter. Gertrude Hardy MA/GULSHAN    BP/med check appt 7/23

## 2020-07-14 NOTE — PROGRESS NOTES
Patient has an up coming pre visit lab appointment on 7.16.2020. Please review pended orders and place any additional future orders that may be needed.     Thank you,  Ariela Hernández MLT ([facility])

## 2020-07-16 DIAGNOSIS — Z13.6 CARDIOVASCULAR SCREENING; LDL GOAL LESS THAN 160: ICD-10-CM

## 2020-07-16 DIAGNOSIS — I10 ESSENTIAL HYPERTENSION WITH GOAL BLOOD PRESSURE LESS THAN 140/90: ICD-10-CM

## 2020-07-16 LAB
ALBUMIN SERPL-MCNC: 3.8 G/DL (ref 3.4–5)
ANION GAP SERPL CALCULATED.3IONS-SCNC: 9 MMOL/L (ref 3–14)
BUN SERPL-MCNC: 33 MG/DL (ref 7–30)
CALCIUM SERPL-MCNC: 9.1 MG/DL (ref 8.5–10.1)
CHLORIDE SERPL-SCNC: 108 MMOL/L (ref 94–109)
CHOLEST SERPL-MCNC: 159 MG/DL
CO2 SERPL-SCNC: 23 MMOL/L (ref 20–32)
CREAT SERPL-MCNC: 1.4 MG/DL (ref 0.66–1.25)
GFR SERPL CREATININE-BSD FRML MDRD: 47 ML/MIN/{1.73_M2}
GLUCOSE SERPL-MCNC: 97 MG/DL (ref 70–99)
HDLC SERPL-MCNC: 35 MG/DL
LDLC SERPL CALC-MCNC: 82 MG/DL
NONHDLC SERPL-MCNC: 124 MG/DL
PHOSPHATE SERPL-MCNC: 3.5 MG/DL (ref 2.5–4.5)
POTASSIUM SERPL-SCNC: 4.2 MMOL/L (ref 3.4–5.3)
SODIUM SERPL-SCNC: 140 MMOL/L (ref 133–144)
TRIGL SERPL-MCNC: 208 MG/DL

## 2020-07-16 PROCEDURE — 36415 COLL VENOUS BLD VENIPUNCTURE: CPT | Performed by: FAMILY MEDICINE

## 2020-07-16 PROCEDURE — 80069 RENAL FUNCTION PANEL: CPT | Performed by: FAMILY MEDICINE

## 2020-07-16 PROCEDURE — 80061 LIPID PANEL: CPT | Performed by: FAMILY MEDICINE

## 2020-07-23 ENCOUNTER — OFFICE VISIT (OUTPATIENT)
Dept: FAMILY MEDICINE | Facility: CLINIC | Age: 80
End: 2020-07-23
Payer: COMMERCIAL

## 2020-07-23 ENCOUNTER — TELEPHONE (OUTPATIENT)
Dept: FAMILY MEDICINE | Facility: CLINIC | Age: 80
End: 2020-07-23

## 2020-07-23 VITALS
SYSTOLIC BLOOD PRESSURE: 102 MMHG | DIASTOLIC BLOOD PRESSURE: 54 MMHG | HEART RATE: 67 BPM | OXYGEN SATURATION: 99 % | WEIGHT: 177 LBS | BODY MASS INDEX: 29.45 KG/M2 | TEMPERATURE: 97.5 F

## 2020-07-23 DIAGNOSIS — M05.9 RHEUMATOID ARTHRITIS WITH POSITIVE RHEUMATOID FACTOR, INVOLVING UNSPECIFIED SITE (H): Primary | ICD-10-CM

## 2020-07-23 DIAGNOSIS — N18.30 CKD (CHRONIC KIDNEY DISEASE) STAGE 3, GFR 30-59 ML/MIN (H): ICD-10-CM

## 2020-07-23 DIAGNOSIS — N52.9 ERECTILE DYSFUNCTION, UNSPECIFIED ERECTILE DYSFUNCTION TYPE: ICD-10-CM

## 2020-07-23 DIAGNOSIS — I10 ESSENTIAL HYPERTENSION WITH GOAL BLOOD PRESSURE LESS THAN 140/90: ICD-10-CM

## 2020-07-23 PROCEDURE — 99214 OFFICE O/P EST MOD 30 MIN: CPT | Performed by: FAMILY MEDICINE

## 2020-07-23 RX ORDER — SILDENAFIL CITRATE 20 MG/1
TABLET ORAL
Qty: 30 TABLET | Refills: 1 | Status: SHIPPED | OUTPATIENT
Start: 2020-07-23 | End: 2020-12-08

## 2020-07-23 RX ORDER — CHOLECALCIFEROL (VITAMIN D3) 25 MCG
CAPSULE ORAL
COMMUNITY
End: 2022-06-20

## 2020-07-23 RX ORDER — AMLODIPINE BESYLATE 5 MG/1
5 TABLET ORAL DAILY
Qty: 90 TABLET | Refills: 3 | Status: SHIPPED | OUTPATIENT
Start: 2020-07-23 | End: 2021-05-06

## 2020-07-23 NOTE — PROGRESS NOTES
SUBJECTIVE:  Kenton Townsend, a 80 year old male scheduled an appointment to discuss the following issues:  Follow-up htn, cri , high cholesterol and Arthritis.  Seeing rheum.  Some weight loss with diet. Some exercise. No chest pain or shortness of breath. Outside blood pressure reading a little low. No nausea, vomiting or diarrhea or black/bloody stools. No leg swelling. No urine changes acutely. Arthritis stable.   Emotionally doing ok. No LIGHTHEADED. Patient would like viagra prescription.   Medical, social, surgical, and family histories reviewed.    ROS:  All other ROS negative.    OBJECTIVE:  /54   Pulse 67   Temp 97.5  F (36.4  C) (Tympanic)   Wt 80.3 kg (177 lb)   SpO2 99%   BMI 29.45 kg/m    EXAM:  GENERAL APPEARANCE: healthy, alert and no distress  EYES: EOMI,  PERRL  NECK: no adenopathy, no asymmetry, masses, or scars and thyroid normal to palpation  RESP: lungs clear to auscultation - no rales, rhonchi or wheezes  CV: regular rates and rhythm, normal S1 S2, no S3 or S4 and no murmur, click or rub -  ABDOMEN:  soft, nontender, no HSM or masses and bowel sounds normal  MS: extremities normal- no gross deformities noted, no evidence of inflammation in joints, FROM in all extremities.  PSYCH: mentation appears normal and affect normal/bright    ASSESSMENT / PLAN:  (M05.9) Rheumatoid arthritis with positive rheumatoid factor, involving unspecified site (H)  (primary encounter diagnosis)  Comment: stable  Plan: per rheum.     (I10) Essential hypertension with goal blood pressure less than 140/90  Comment: over treatment  Plan: amLODIPine (NORVASC) 5 MG tablet        1/2 norvasc and continue self-monitor. Return to clinic if worse. Chest pain or shortness of breath to er.     (N18.3) CKD (chronic kidney disease) stage 3, GFR 30-59 ml/min (H)  Comment: sable  Plan: seeing rheum. Consider stopping hydrochlorothiazide.    (N52.9) Erectile dysfunction, unspecified erectile dysfunction type  Comment:  viagra helpful in past  Plan: sildenafil (REVATIO) 20 MG tablet        To ER if chest pain, shortness of breath , prolonged erections      Geovanni Vincent MD

## 2020-07-23 NOTE — TELEPHONE ENCOUNTER
Central Prior Authorization Team   Phone: [phone]      PRIOR AUTHORIZATION DENIED    Medication: sildenafil (REVATIO) 20 MG tablet - DENIED    Denial Date: 7/23/2020    Denial Rational: The FD approved us for this drug is pulmonary arterial hypertension (PAH) World Health Organization (WHO) Group 1.          Appeal Information:

## 2020-07-23 NOTE — TELEPHONE ENCOUNTER
Prior Authorization Retail Medication Request    Medication/Dose: sildenafil (REVATIO) 20 MG tablet   ICD code (if different than what is on RX):  Erectile dysfunction, unspecified erectile dysfunction type (N52.9)   Previously Tried and Failed:   Rationale:      Insurance Name: MEDICA  Insurance ID:  [id number]      Pharmacy Information (if different than what is on RX)  Name:  [facility] [id number] [place] [address]   Phone: [phone]

## 2020-07-23 NOTE — TELEPHONE ENCOUNTER
Central Prior Authorization Team   Phone: [phone]      PA Initiation    Medication: sildenafil (REVATIO) 20 MG tablet - INITIATED  Insurance Company: [employer] - Phone [phone] Fax [phone]  Pharmacy Filling the Rx: [id number] [place] - [address] [place] [address]  Filling Pharmacy Phone: [phone]  Filling Pharmacy Fax: [phone]  Start Date: 7/23/2020

## 2020-08-11 DIAGNOSIS — E87.6 HYPOKALEMIA: ICD-10-CM

## 2020-08-11 DIAGNOSIS — I10 ESSENTIAL HYPERTENSION WITH GOAL BLOOD PRESSURE LESS THAN 140/90: ICD-10-CM

## 2020-08-11 RX ORDER — POTASSIUM CHLORIDE 750 MG/1
TABLET, EXTENDED RELEASE ORAL
Qty: 90 TABLET | Refills: 0 | Status: SHIPPED | OUTPATIENT
Start: 2020-08-11 | End: 2020-12-04

## 2020-08-11 NOTE — TELEPHONE ENCOUNTER
Refill request received within 30 days of last office visit with pcp.  Prescription is routed to the provider to please address refill.   Ami Neil RN

## 2020-10-15 DIAGNOSIS — I10 ESSENTIAL HYPERTENSION WITH GOAL BLOOD PRESSURE LESS THAN 140/90: ICD-10-CM

## 2020-10-15 NOTE — TELEPHONE ENCOUNTER
Routing refill request to provider for review/approval because:  Labs out of range:    Creatinine   Date Value Ref Range Status   07/16/2020 1.40 (H) 0.66 - 1.25 mg/dL Final         Eula MALDONADON, RN, CPN

## 2020-10-16 RX ORDER — LISINOPRIL/HYDROCHLOROTHIAZIDE 10-12.5 MG
TABLET ORAL
Qty: 90 TABLET | Refills: 0 | Status: SHIPPED | OUTPATIENT
Start: 2020-10-16 | End: 2021-01-11

## 2020-12-07 DIAGNOSIS — N52.9 ERECTILE DYSFUNCTION, UNSPECIFIED ERECTILE DYSFUNCTION TYPE: ICD-10-CM

## 2020-12-08 RX ORDER — SILDENAFIL CITRATE 20 MG/1
TABLET ORAL
Qty: 30 TABLET | Refills: 1 | Status: SHIPPED | OUTPATIENT
Start: 2020-12-08 | End: 2021-02-09

## 2020-12-08 NOTE — TELEPHONE ENCOUNTER
Prescription approved per [facility] Refill Protocol.  Ami Neil RN

## 2020-12-09 ENCOUNTER — TRANSFERRED RECORDS (OUTPATIENT)
Dept: HEALTH INFORMATION MANAGEMENT | Facility: CLINIC | Age: 80
End: 2020-12-09

## 2021-01-11 DIAGNOSIS — I10 ESSENTIAL HYPERTENSION WITH GOAL BLOOD PRESSURE LESS THAN 140/90: ICD-10-CM

## 2021-01-11 RX ORDER — LISINOPRIL/HYDROCHLOROTHIAZIDE 10-12.5 MG
TABLET ORAL
Qty: 90 TABLET | Refills: 0 | Status: SHIPPED | OUTPATIENT
Start: 2021-01-11 | End: 2021-04-12

## 2021-01-11 NOTE — TELEPHONE ENCOUNTER
Routing refill request to provider for review/approval because:  Labs out of range:  Cr  Estela Garnica BSN, RN

## 2021-02-09 DIAGNOSIS — N52.9 ERECTILE DYSFUNCTION, UNSPECIFIED ERECTILE DYSFUNCTION TYPE: ICD-10-CM

## 2021-02-09 RX ORDER — SILDENAFIL CITRATE 20 MG/1
TABLET ORAL
Qty: 30 TABLET | Refills: 1 | Status: SHIPPED | OUTPATIENT
Start: 2021-02-09 | End: 2021-07-19

## 2021-02-09 NOTE — TELEPHONE ENCOUNTER
Prescription approved per [facility] Refill Protocol.  Ami Neil RN

## 2021-03-11 ENCOUNTER — IMMUNIZATION (OUTPATIENT)
Dept: NURSING | Facility: CLINIC | Age: 81
End: 2021-03-11
Payer: COMMERCIAL

## 2021-03-11 PROCEDURE — 91300 PR COVID VAC PFIZER DIL RECON 30 MCG/0.3 ML IM: CPT

## 2021-03-11 PROCEDURE — 0001A PR COVID VAC PFIZER DIL RECON 30 MCG/0.3 ML IM: CPT

## 2021-04-01 ENCOUNTER — IMMUNIZATION (OUTPATIENT)
Dept: NURSING | Facility: CLINIC | Age: 81
End: 2021-04-01
Attending: FAMILY MEDICINE
Payer: COMMERCIAL

## 2021-04-01 PROCEDURE — 91300 PR COVID VAC PFIZER DIL RECON 30 MCG/0.3 ML IM: CPT

## 2021-04-01 PROCEDURE — 0002A PR COVID VAC PFIZER DIL RECON 30 MCG/0.3 ML IM: CPT

## 2021-04-11 DIAGNOSIS — I10 ESSENTIAL HYPERTENSION WITH GOAL BLOOD PRESSURE LESS THAN 140/90: ICD-10-CM

## 2021-04-11 NOTE — LETTER
April 12, 2021    Kenton Townsend  [address]  [place] [address]    Dear Kenton,       We recently received a refill request for lisinopril-hydrochlorothiazide (ZESTORETIC) 10-12.5 MG tablet.  We have refilled this for a one time 90 day supply only because you are due for a:    Blood pressure/medication check office visit and fasting lab appointment-needed in the next 2 months per Dr Vincent.      Please schedule this lab appointment 4-5 days prior to the office visit.     Please call at your earliest convenience so that there will not be a delay with your future refills.          Thank you,   Your [facility] Team/  [phone]

## 2021-04-12 RX ORDER — LISINOPRIL/HYDROCHLOROTHIAZIDE 10-12.5 MG
TABLET ORAL
Qty: 90 TABLET | Refills: 0 | Status: SHIPPED | OUTPATIENT
Start: 2021-04-12 | End: 2021-07-06

## 2021-04-12 NOTE — TELEPHONE ENCOUNTER
Routing refill request to provider for review/approval because:  Labs out of range:  Creatinine    Althea Gambino BSN, RN

## 2021-04-24 ENCOUNTER — DOCUMENTATION ONLY (OUTPATIENT)
Dept: FAMILY MEDICINE | Facility: CLINIC | Age: 81
End: 2021-04-24

## 2021-04-24 DIAGNOSIS — I10 ESSENTIAL HYPERTENSION WITH GOAL BLOOD PRESSURE LESS THAN 140/90: Primary | ICD-10-CM

## 2021-04-24 DIAGNOSIS — E78.5 HYPERLIPIDEMIA LDL GOAL <160: ICD-10-CM

## 2021-04-24 DIAGNOSIS — Z13.6 CARDIOVASCULAR SCREENING; LDL GOAL LESS THAN 160: ICD-10-CM

## 2021-04-25 NOTE — PROGRESS NOTES
Patient scheduled to see Lab on 4/29/2021 for Pre-visit labs.  Patient does not qualify per Pre-visit protocol.  Please place future orders, or call and advise patient to cancel Lab appointment.

## 2021-04-30 DIAGNOSIS — I10 ESSENTIAL HYPERTENSION WITH GOAL BLOOD PRESSURE LESS THAN 140/90: ICD-10-CM

## 2021-04-30 DIAGNOSIS — E78.5 HYPERLIPIDEMIA LDL GOAL <160: ICD-10-CM

## 2021-04-30 DIAGNOSIS — Z13.6 CARDIOVASCULAR SCREENING; LDL GOAL LESS THAN 160: ICD-10-CM

## 2021-04-30 LAB
ALBUMIN SERPL-MCNC: 4 G/DL (ref 3.4–5)
ANION GAP SERPL CALCULATED.3IONS-SCNC: 2 MMOL/L (ref 3–14)
BUN SERPL-MCNC: 33 MG/DL (ref 7–30)
CALCIUM SERPL-MCNC: 9.1 MG/DL (ref 8.5–10.1)
CHLORIDE SERPL-SCNC: 108 MMOL/L (ref 94–109)
CHOLEST SERPL-MCNC: 193 MG/DL
CO2 SERPL-SCNC: 29 MMOL/L (ref 20–32)
CREAT SERPL-MCNC: 1.63 MG/DL (ref 0.66–1.25)
GFR SERPL CREATININE-BSD FRML MDRD: 39 ML/MIN/{1.73_M2}
GLUCOSE SERPL-MCNC: 102 MG/DL (ref 70–99)
HDLC SERPL-MCNC: 36 MG/DL
LDLC SERPL CALC-MCNC: 109 MG/DL
NONHDLC SERPL-MCNC: 157 MG/DL
PHOSPHATE SERPL-MCNC: 3 MG/DL (ref 2.5–4.5)
POTASSIUM SERPL-SCNC: 4.6 MMOL/L (ref 3.4–5.3)
SODIUM SERPL-SCNC: 139 MMOL/L (ref 133–144)
TRIGL SERPL-MCNC: 242 MG/DL

## 2021-04-30 PROCEDURE — 80061 LIPID PANEL: CPT | Performed by: FAMILY MEDICINE

## 2021-04-30 PROCEDURE — 80069 RENAL FUNCTION PANEL: CPT | Performed by: FAMILY MEDICINE

## 2021-04-30 PROCEDURE — 36415 COLL VENOUS BLD VENIPUNCTURE: CPT | Performed by: FAMILY MEDICINE

## 2021-04-30 NOTE — LETTER
May 3, 2021      Kenton Townsend  [address]  [place] [address]        Dear ,    We are writing to inform you of your test results.    Generally normal results except kidney tests worse. Discuss labs and check blood pressure at upcoming md appointment.      If you have any questions or concerns, please call the clinic at the number listed above.       Sincerely,      Geovanni Vincent MD/[facility]      Resulted Orders   Lipid panel reflex to direct LDL Fasting   Result Value Ref Range    Cholesterol 193 <200 mg/dL    Triglycerides 242 (H) <150 mg/dL      Comment:      Borderline high:  150-199 mg/dl  High:             200-499 mg/dl  Very high:       >499 mg/dl      HDL Cholesterol 36 (L) >39 mg/dL    LDL Cholesterol Calculated 109 (H) <100 mg/dL      Comment:      Above desirable:  100-129 mg/dl  Borderline High:  130-159 mg/dL  High:             160-189 mg/dL  Very high:       >189 mg/dl      Non HDL Cholesterol 157 (H) <130 mg/dL      Comment:      Above Desirable:  130-159 mg/dl  Borderline high:  160-189 mg/dl  High:             190-219 mg/dl  Very high:       >219 mg/dl     Renal panel (Alb, BUN, Ca, Cl, CO2, Creat, Gluc, Phos, K, Na)   Result Value Ref Range    Sodium 139 133 - 144 mmol/L    Potassium 4.6 3.4 - 5.3 mmol/L    Chloride 108 94 - 109 mmol/L    Carbon Dioxide 29 20 - 32 mmol/L    Anion Gap 2 (L) 3 - 14 mmol/L    Glucose 102 (H) 70 - 99 mg/dL    Urea Nitrogen 33 (H) 7 - 30 mg/dL    Creatinine 1.63 (H) 0.66 - 1.25 mg/dL    GFR Estimate 39 (L) >60 mL/min/[1.73_m2]      Comment:      Non  GFR Calc  Starting 12/18/2018, serum creatinine based estimated GFR (eGFR) will be   calculated using the Chronic Kidney Disease Epidemiology Collaboration   (CKD-EPI) equation.      GFR Estimate If Black 45 (L) >60 mL/min/[1.73_m2]      Comment:       GFR Calc  Starting 12/18/2018, serum creatinine based estimated GFR (eGFR) will be   calculated using the Chronic  Kidney Disease Epidemiology Collaboration   (CKD-EPI) equation.      Calcium 9.1 8.5 - 10.1 mg/dL    Phosphorus 3.0 2.5 - 4.5 mg/dL    Albumin 4.0 3.4 - 5.0 g/dL

## 2021-05-06 ENCOUNTER — OFFICE VISIT (OUTPATIENT)
Dept: FAMILY MEDICINE | Facility: CLINIC | Age: 81
End: 2021-05-06
Payer: COMMERCIAL

## 2021-05-06 VITALS
TEMPERATURE: 96.9 F | HEIGHT: 67 IN | HEART RATE: 61 BPM | DIASTOLIC BLOOD PRESSURE: 71 MMHG | WEIGHT: 199 LBS | OXYGEN SATURATION: 97 % | SYSTOLIC BLOOD PRESSURE: 133 MMHG | BODY MASS INDEX: 31.23 KG/M2

## 2021-05-06 DIAGNOSIS — I10 ESSENTIAL HYPERTENSION WITH GOAL BLOOD PRESSURE LESS THAN 140/90: Primary | ICD-10-CM

## 2021-05-06 DIAGNOSIS — M05.9 RHEUMATOID ARTHRITIS WITH POSITIVE RHEUMATOID FACTOR, INVOLVING UNSPECIFIED SITE (H): ICD-10-CM

## 2021-05-06 DIAGNOSIS — N18.30 STAGE 3 CHRONIC KIDNEY DISEASE, UNSPECIFIED WHETHER STAGE 3A OR 3B CKD (H): ICD-10-CM

## 2021-05-06 PROCEDURE — 99214 OFFICE O/P EST MOD 30 MIN: CPT | Performed by: FAMILY MEDICINE

## 2021-05-06 RX ORDER — AMLODIPINE BESYLATE 5 MG/1
5 TABLET ORAL DAILY
Qty: 90 TABLET | Refills: 3 | Status: SHIPPED | OUTPATIENT
Start: 2021-05-06 | End: 2022-02-18

## 2021-05-06 ASSESSMENT — MIFFLIN-ST. JEOR: SCORE: 1566.29

## 2021-05-06 NOTE — PROGRESS NOTES
"SUBJECTIVE:  Kenton Townsend, a 80 year old male scheduled an appointment to discuss the following issues:  Follow-up htn, cri , high cholesterol and Arthritis.  Seeing rheum. +weight gain.  Outside blood pressure readings 130/70. No chest pain or shortness of breath. No nausea, vomiting or diarrhea or black/bloody stools. No urine changes or hematuria. No leg swelling. Emotionally doing ok. Memory ok. Water intake good.   Coffee up to 1/2-1 pot/day.    Lives with kid now. .   Medical, social, surgical, and family histories reviewed.    ROS:  All other ROS negative.  OBJECTIVE:  /71   Pulse 61   Temp 96.9  F (36.1  C) (Tympanic)   Ht 1.702 m (5' 7\")   Wt 90.3 kg (199 lb)   SpO2 97%   BMI 31.17 kg/m   EXAM:  GENERAL APPEARANCE: healthy, alert and no distress  EYES: EOMI,  PERRL  NECK: no adenopathy, no asymmetry, masses, or scars and thyroid normal to palpation  RESP: lungs clear to auscultation - no rales, rhonchi or wheezes  CV: regular rates and rhythm, normal S1 S2, no S3 or S4 and no murmur, click or rub -  ABDOMEN:  soft, nontender, no HSM or masses and bowel sounds normal  MS: extremities normal- no gross deformities noted, no evidence of inflammation in joints, FROM in all extremities.  PSYCH: mentation appears normal and affect normal/bright    ASSESSMENT / PLAN:  (I10) Essential hypertension with goal blood pressure less than 140/90  (primary encounter diagnosis)  Comment: stable  Plan: amLODIPine (NORVASC) 5 MG tablet        Continue self-monitor. Chest pain or shortness of breath to er. Recheck in 6 months  Sooner if worse  5-10 lbs weight loss.     (M05.9) Rheumatoid arthritis with positive rheumatoid factor, involving unspecified site (H)  Comment: stable  Plan: per rheum    (N18.30) Stage 3 chronic kidney disease, unspecified whether stage 3a or 3b CKD  Comment: worse but up/down in past and was fasting.   Plan: Recheck in 6 months  More water/less coffee. Return to clinic sooner " if leg swelling/ rapid weight changes/ urine changes.     Geovanni Vincent MD

## 2021-05-30 DIAGNOSIS — E87.6 HYPOKALEMIA: ICD-10-CM

## 2021-05-30 DIAGNOSIS — I10 ESSENTIAL HYPERTENSION WITH GOAL BLOOD PRESSURE LESS THAN 140/90: ICD-10-CM

## 2021-06-01 RX ORDER — POTASSIUM CHLORIDE 750 MG/1
TABLET, EXTENDED RELEASE ORAL
Qty: 90 TABLET | Refills: 1 | Status: SHIPPED | OUTPATIENT
Start: 2021-06-01 | End: 2021-11-24

## 2021-07-06 DIAGNOSIS — I10 ESSENTIAL HYPERTENSION WITH GOAL BLOOD PRESSURE LESS THAN 140/90: ICD-10-CM

## 2021-07-06 RX ORDER — LISINOPRIL/HYDROCHLOROTHIAZIDE 10-12.5 MG
TABLET ORAL
Qty: 90 TABLET | Refills: 1 | Status: SHIPPED | OUTPATIENT
Start: 2021-07-06 | End: 2022-01-31

## 2021-07-19 DIAGNOSIS — N52.9 ERECTILE DYSFUNCTION, UNSPECIFIED ERECTILE DYSFUNCTION TYPE: ICD-10-CM

## 2021-07-19 RX ORDER — SILDENAFIL CITRATE 20 MG/1
TABLET ORAL
Qty: 30 TABLET | Refills: 1 | Status: SHIPPED | OUTPATIENT
Start: 2021-07-19 | End: 2021-10-07

## 2021-10-07 DIAGNOSIS — N52.9 ERECTILE DYSFUNCTION, UNSPECIFIED ERECTILE DYSFUNCTION TYPE: ICD-10-CM

## 2021-10-07 RX ORDER — SILDENAFIL CITRATE 20 MG/1
TABLET ORAL
Qty: 30 TABLET | Refills: 1 | Status: SHIPPED | OUTPATIENT
Start: 2021-10-07 | End: 2022-03-29

## 2022-01-29 DIAGNOSIS — I10 ESSENTIAL HYPERTENSION WITH GOAL BLOOD PRESSURE LESS THAN 140/90: ICD-10-CM

## 2022-01-31 RX ORDER — LISINOPRIL/HYDROCHLOROTHIAZIDE 10-12.5 MG
TABLET ORAL
Qty: 90 TABLET | Refills: 0 | Status: SHIPPED | OUTPATIENT
Start: 2022-01-31 | End: 2022-02-18

## 2022-01-31 NOTE — TELEPHONE ENCOUNTER
"Requested Prescriptions   Pending Prescriptions Disp Refills    lisinopril-hydrochlorothiazide (ZESTORETIC) 10-12.5 MG tablet [Pharmacy Med Name: LISINOPRIL-HCTZ 10-12.5 MG TAB] 90 tablet 1     Sig: TAKE 1 TABLET BY MOUTH EVERY DAY        Diuretics (Including Combos) Protocol Failed - 1/29/2022  1:28 PM        Failed - Normal serum creatinine on file in past 12 months       Recent Labs   Lab Test 04/30/21  1343   CR 1.63*              Passed - Blood pressure under 140/90 in past 12 months       BP Readings from Last 3 Encounters:   05/06/21 133/71   07/23/20 102/54   01/15/20 135/70                 Passed - Recent (12 mo) or future (30 days) visit within the authorizing provider's specialty     Patient has had an office visit with the authorizing provider or a provider within the authorizing providers department within the previous 12 mos or has a future within next 30 days. See \"Patient Info\" tab in inbasket, or \"Choose Columns\" in Meds & Orders section of the refill encounter.              Passed - Medication is active on med list        Passed - Patient is age 18 or older        Passed - Normal serum potassium on file in past 12 months       Recent Labs   Lab Test 04/30/21  1343   POTASSIUM 4.6                    Passed - Normal serum sodium on file in past 12 months       Recent Labs   Lab Test 04/30/21  1343                ACE Inhibitors (Including Combos) Protocol Failed - 1/29/2022  1:28 PM        Failed - Normal serum creatinine on file in past 12 months     Recent Labs   Lab Test 04/30/21  1343   CR 1.63*       Ok to refill medication if creatinine is low          Passed - Blood pressure under 140/90 in past 12 months       BP Readings from Last 3 Encounters:   05/06/21 133/71   07/23/20 102/54   01/15/20 135/70                 Passed - Recent (12 mo) or future (30 days) visit within the authorizing provider's specialty     Patient has had an office visit with the authorizing provider or a provider " "within the authorizing providers department within the previous 12 mos or has a future within next 30 days. See \"Patient Info\" tab in inbasket, or \"Choose Columns\" in Meds & Orders section of the refill encounter.              Passed - Medication is active on med list        Passed - Patient is age 18 or older        Passed - Normal serum potassium on file in past 12 months     Recent Labs   Lab Test 04/30/21  1343   POTASSIUM 4.6                     "

## 2022-02-07 ENCOUNTER — TELEPHONE (OUTPATIENT)
Dept: FAMILY MEDICINE | Facility: CLINIC | Age: 82
End: 2022-02-07
Payer: COMMERCIAL

## 2022-02-07 NOTE — CONFIDENTIAL NOTE
Reason for Call:  Patient was just discharged from the hospital possible stroke.  Was told to follow up with Dr. Vincent. Earliest appointment open is 03/01/2022.      Patient needs to be seen much sooner per discharge summary.    Detailed comments: Please see if patient can be worked into Dr. Rodriguez appointment schedule and Call patient.    Phone Number Patient can be reached at: Home number on file [phone] (home)    Best Time: Anytime    Can we leave a detailed message on this number? YES    Call taken on 2/7/2022 at 10:46 AM by Tiffanie Arguello     [facility]

## 2022-02-08 NOTE — TELEPHONE ENCOUNTER
I have same day spots held every AM and can probably find an eariler day for him if he calls in the AMs.. Geovanni Vincent MD

## 2022-02-10 ENCOUNTER — OFFICE VISIT (OUTPATIENT)
Dept: FAMILY MEDICINE | Facility: CLINIC | Age: 82
End: 2022-02-10
Payer: COMMERCIAL

## 2022-02-10 VITALS
SYSTOLIC BLOOD PRESSURE: 137 MMHG | BODY MASS INDEX: 30.38 KG/M2 | DIASTOLIC BLOOD PRESSURE: 87 MMHG | WEIGHT: 194 LBS | TEMPERATURE: 97.5 F | HEART RATE: 63 BPM | OXYGEN SATURATION: 98 %

## 2022-02-10 DIAGNOSIS — L57.0 ACTINIC KERATOSIS: ICD-10-CM

## 2022-02-10 DIAGNOSIS — I63.9 CEREBROVASCULAR ACCIDENT (CVA), UNSPECIFIED MECHANISM (H): Primary | ICD-10-CM

## 2022-02-10 DIAGNOSIS — E78.5 HYPERLIPIDEMIA LDL GOAL <100: ICD-10-CM

## 2022-02-10 DIAGNOSIS — I10 ESSENTIAL HYPERTENSION WITH GOAL BLOOD PRESSURE LESS THAN 140/90: ICD-10-CM

## 2022-02-10 PROCEDURE — 99214 OFFICE O/P EST MOD 30 MIN: CPT | Mod: 25 | Performed by: FAMILY MEDICINE

## 2022-02-10 PROCEDURE — 17000 DESTRUCT PREMALG LESION: CPT | Performed by: FAMILY MEDICINE

## 2022-02-10 RX ORDER — ATORVASTATIN CALCIUM 40 MG/1
40 TABLET, FILM COATED ORAL DAILY
COMMUNITY
Start: 2022-02-05 | End: 2022-04-14

## 2022-02-10 RX ORDER — CLOPIDOGREL BISULFATE 75 MG/1
75 TABLET ORAL
COMMUNITY
Start: 2022-02-06 | End: 2022-04-14

## 2022-02-10 RX ORDER — LISINOPRIL AND HYDROCHLOROTHIAZIDE 20; 25 MG/1; MG/1
1 TABLET ORAL DAILY
Qty: 90 TABLET | Refills: 0 | Status: SHIPPED | OUTPATIENT
Start: 2022-02-10 | End: 2022-02-18 | Stop reason: ALTCHOICE

## 2022-02-10 RX ORDER — ASPIRIN 81 MG/1
81 TABLET, CHEWABLE ORAL
COMMUNITY
Start: 2022-02-06 | End: 2022-06-20

## 2022-02-10 NOTE — PROGRESS NOTES
ASSESSMENT / PLAN:  (I63.9) Cerebrovascular accident (CVA), unspecified mechanism (H)  (primary encounter diagnosis)  Comment: stable  Plan: CBC with platelets        Seeing vacular surgery in future. Needs tighter control of blood pressure and lipitor should help. Continue plavix/asa. Expected course and warning signs reviewed. To ER if repeat symptoms.    (I10) Essential hypertension with goal blood pressure less than 140/90  Comment: a little high  Plan: lisinopril-hydrochlorothiazide (ZESTORETIC)         20-25 MG tablet, Renal panel        Will double lisinopril/hctz and continue higher norvasc. Continue self-montor. Chest pain or shortness of breath to er. Reveiwed risks and side effects of medication  Return to clinic 2 months repeat labs. Sooner if worse    (E78.5) Hyperlipidemia LDL goal <100  Comment: not bad but history cva-  Needs statin  Plan: Lipid Profile        Continue lipitor. Reveiwed risks and side effects of medication      (L57.0) Actinic keratosis  Comment: likely the lesion on check  Plan: DESTRUCT BENIGN LESION, UP TO 14        Expected course and warning signs reviewed. Dermatology if persists/worse.         Adeola Fung is a 81 year old who presents for the following health issues  Follow-up CVA. Started on lipitor, plavix and norvasc dosage increased. Thought to be from small vessel ischemia. Had cta head/neck and TTE (normal).   Labs done cr=1.7 (baseline). Swel0b=7.9. LDL 84. Cbc ok except platelets at 109.   Outside blood pressure readings a little high.   Word confusion improving. Speech doing ok. No focal weakness. No acute visual changes. No chest pain or shortness of breath. Sleep overall ok.   Emotionally doing ok. . Family helpful. No bleeding issues. No black/bloody stools. No bruising issues.   Seeing vascular surgery. Exercise needs a little more.   Skin lesion on check past year. Flaking/itching.     HPI       Hospital Follow-up Visit:    Hospital/Nursing  [facility]   Date of Admission: 02/03/22  Date of Discharge: 02/05/22  Reason(s) for Admission: Acute CVA       Was your hospitalization related to COVID-19? No   Problems taking medications regularly:  None  Medication changes since discharge: None  Problems adhering to non-medication therapy:  None    Summary of hospitalization:  CareEverywhere information obtained and reviewed  Diagnostic Tests/Treatments reviewed.  Follow up needed: vascular surgery  Other Healthcare Providers Involved in Patient s Care:         None  Update since discharge: improved. Post Discharge Medication Reconciliation: discharge medications reconciled, continue medications without change.  Plan of care communicated with patient            Review of Systems   All other ROS negative.      Objective    There were no vitals taken for this visit.  There is no height or weight on file to calculate BMI.   /87   Pulse 63   Temp 97.5  F (36.4  C) (Tympanic)   Wt 88 kg (194 lb)   SpO2 98%   BMI 30.38 kg/m     Physical Exam   GENERAL: healthy, alert and no distress  EYES: Eyes grossly normal to inspection, PERRL and conjunctivae and sclerae normal  HENT: ear canals and TM's normal, nose and mouth without ulcers or lesions  NECK: no adenopathy, no asymmetry, masses, or scars and thyroid normal to palpation  RESP: lungs clear to auscultation - no rales, rhonchi or wheezes  CV: regular rate and rhythm, normal S1 S2, no S3 or S4, no murmur, click or rub, no peripheral edema and peripheral pulses strong  ABDOMEN: soft, nontender, no hepatosplenomegaly, no masses and bowel sounds normal  MS: no gross musculoskeletal defects noted, no edema  SKIN: raised red lesion on white base 1 cm diameter  NEURO: Normal strength and tone, mentation intact and speech normal  PSYCH: mentation appears normal, affect normal/bright    Procedure: Reveiwed risks and benefits of procedure.  Patient agreeable to plan.  Cryo therapy x2 to lesion.  Patient tolerated well. Aftercare and warning signs reviewed

## 2022-02-14 ENCOUNTER — TRANSFERRED RECORDS (OUTPATIENT)
Dept: HEALTH INFORMATION MANAGEMENT | Facility: CLINIC | Age: 82
End: 2022-02-14
Payer: COMMERCIAL

## 2022-02-15 ENCOUNTER — TELEPHONE (OUTPATIENT)
Dept: FAMILY MEDICINE | Facility: CLINIC | Age: 82
End: 2022-02-15
Payer: COMMERCIAL

## 2022-02-15 NOTE — TELEPHONE ENCOUNTER
Patient saw Dr. Geovanni Vincent for hospital follow up on 2/10 for stroke  Patient taking Amlodipine 5mg 2 tabs daily and lisinopril-hydrochlorothiazide 20mg-25mg daily  Patient calling today a blood pressure has been 160s-170s/90s  Patient denies chest pain or lightheadedness. No headache but has blurred vision that is worsening  Patient today = 179/94  Instructed patient needs to go back to the ED to rule out another stroke  Patient verbalized understanding      Eula MALDONADON, RN

## 2022-02-18 ENCOUNTER — OFFICE VISIT (OUTPATIENT)
Dept: FAMILY MEDICINE | Facility: CLINIC | Age: 82
End: 2022-02-18
Payer: COMMERCIAL

## 2022-02-18 VITALS
HEIGHT: 67 IN | WEIGHT: 189 LBS | SYSTOLIC BLOOD PRESSURE: 150 MMHG | BODY MASS INDEX: 29.66 KG/M2 | OXYGEN SATURATION: 98 % | HEART RATE: 66 BPM | DIASTOLIC BLOOD PRESSURE: 78 MMHG

## 2022-02-18 DIAGNOSIS — I10 ESSENTIAL HYPERTENSION WITH GOAL BLOOD PRESSURE LESS THAN 140/90: ICD-10-CM

## 2022-02-18 DIAGNOSIS — N18.32 STAGE 3B CHRONIC KIDNEY DISEASE (H): ICD-10-CM

## 2022-02-18 DIAGNOSIS — N17.9 ACUTE KIDNEY INJURY (H): ICD-10-CM

## 2022-02-18 DIAGNOSIS — E87.6 HYPOKALEMIA: ICD-10-CM

## 2022-02-18 DIAGNOSIS — I10 HYPERTENSION GOAL BP (BLOOD PRESSURE) < 140/90: Primary | ICD-10-CM

## 2022-02-18 DIAGNOSIS — M05.9 RHEUMATOID ARTHRITIS WITH POSITIVE RHEUMATOID FACTOR, INVOLVING UNSPECIFIED SITE (H): Primary | ICD-10-CM

## 2022-02-18 LAB
ANION GAP SERPL CALCULATED.3IONS-SCNC: 5 MMOL/L (ref 3–14)
BUN SERPL-MCNC: 61 MG/DL (ref 7–30)
CALCIUM SERPL-MCNC: 10.3 MG/DL (ref 8.5–10.1)
CHLORIDE BLD-SCNC: 108 MMOL/L (ref 94–109)
CO2 SERPL-SCNC: 25 MMOL/L (ref 20–32)
CREAT SERPL-MCNC: 2.28 MG/DL (ref 0.66–1.25)
CREAT UR-MCNC: 138 MG/DL
GFR SERPL CREATININE-BSD FRML MDRD: 28 ML/MIN/1.73M2
GLUCOSE BLD-MCNC: 114 MG/DL (ref 70–99)
HGB BLD-MCNC: 14.1 G/DL (ref 13.3–17.7)
MICROALBUMIN UR-MCNC: 7 MG/L
MICROALBUMIN/CREAT UR: 5.07 MG/G CR (ref 0–17)
POTASSIUM BLD-SCNC: 5.4 MMOL/L (ref 3.4–5.3)
SODIUM SERPL-SCNC: 138 MMOL/L (ref 133–144)

## 2022-02-18 PROCEDURE — 99214 OFFICE O/P EST MOD 30 MIN: CPT | Performed by: NURSE PRACTITIONER

## 2022-02-18 PROCEDURE — 36415 COLL VENOUS BLD VENIPUNCTURE: CPT | Performed by: NURSE PRACTITIONER

## 2022-02-18 PROCEDURE — 85018 HEMOGLOBIN: CPT | Performed by: NURSE PRACTITIONER

## 2022-02-18 PROCEDURE — 80048 BASIC METABOLIC PNL TOTAL CA: CPT | Performed by: NURSE PRACTITIONER

## 2022-02-18 PROCEDURE — 82043 UR ALBUMIN QUANTITATIVE: CPT | Performed by: NURSE PRACTITIONER

## 2022-02-18 RX ORDER — BRIMONIDINE TARTRATE 2 MG/ML
SOLUTION/ DROPS OPHTHALMIC AT BEDTIME
COMMUNITY
Start: 2022-02-15

## 2022-02-18 RX ORDER — POTASSIUM CHLORIDE 750 MG/1
TABLET, EXTENDED RELEASE ORAL
Qty: 90 TABLET | Refills: 0 | Status: SHIPPED | OUTPATIENT
Start: 2022-02-18 | End: 2022-02-21 | Stop reason: SINTOL

## 2022-02-18 RX ORDER — PREDNISONE 5 MG/1
5 TABLET ORAL DAILY
Qty: 30 TABLET | Refills: 1 | Status: SHIPPED | OUTPATIENT
Start: 2022-02-18 | End: 2022-04-14

## 2022-02-18 RX ORDER — AMLODIPINE BESYLATE 10 MG/1
10 TABLET ORAL DAILY
COMMUNITY
Start: 2022-02-18 | End: 2022-04-14

## 2022-02-18 RX ORDER — HYDRALAZINE HYDROCHLORIDE 25 MG/1
25 TABLET, FILM COATED ORAL 2 TIMES DAILY
Qty: 60 TABLET | Refills: 2 | Status: SHIPPED | OUTPATIENT
Start: 2022-02-18 | End: 2022-03-14

## 2022-02-18 RX ORDER — LISINOPRIL/HYDROCHLOROTHIAZIDE 10-12.5 MG
2 TABLET ORAL DAILY
Start: 2022-02-18 | End: 2022-02-21 | Stop reason: SINTOL

## 2022-02-18 NOTE — PROGRESS NOTES
"  Assessment & Plan     Hypertension goal BP (blood pressure) < 140/90  BP not at goal, but improved since ER visit.   Continue amlodipine 10 mg and lisinopril-HCTZ 20-25 mg-med list updated to reflect current dosing.    Apprehensive to add another diuretic given his recent ROSALIA.  Considered beta-blocker however his resting heart rate is already fairly low.  Given this we will have him add hydralazine 25 mg twice daily.  Follow-up labs in process.  Plan to have him return in the next 2 weeks for blood pressure check on ancillary schedule.  Instructed to only check his blood pressure once per day, should be at least 1 hour after his medication and after 10 to 15 minutes of rest.  - hydrALAZINE (APRESOLINE) 25 MG tablet; Take 1 tablet (25 mg) by mouth 2 times daily  - amLODIPine (NORVASC) 10 MG tablet; Take 1 tablet (10 mg) by mouth daily  - lisinopril-hydrochlorothiazide (ZESTORETIC) 10-12.5 MG tablet; Take 2 tablets by mouth daily  - Hemoglobin  - Basic metabolic panel  (Ca, Cl, CO2, Creat, Gluc, K, Na, BUN)  - Albumin Random Urine Quantitative with Creat Ratio    Acute kidney injury (H)  Given IV fluid in ER.  Labs in process.   - Basic metabolic panel  (Ca, Cl, CO2, Creat, Gluc, K, Na, BUN)    Stage 3b chronic kidney disease (H)    - Hemoglobin  - Basic metabolic panel  (Ca, Cl, CO2, Creat, Gluc, K, Na, BUN)  - Albumin Random Urine Quantitative with Creat Ratio           BMI:   Estimated body mass index is 29.6 kg/m  as calculated from the following:    Height as of this encounter: 1.702 m (5' 7\").    Weight as of this encounter: 85.7 kg (189 lb).         Return in about 2 weeks (around 3/4/2022) for BP Recheck.    Melissa Canchola, [facility] GASPER Fung is a 81 year old who presents for the following health issues     HPI     ED/UC Followup:    Facility:  [facility]  Date of visit: 2/15/2022  Reason for visit: HTN, ROSALIA  Current Status: improved     ER visit 2/15-went to the " "emergency department because systolic blood pressures were 180-190 at home.  He has a history of stroke and was hospitalized for that earlier this month.  At the time of his stroke amlodipine was increased from 5 to 10 mg. When he followed up with his PCP last week his lisinopril-HCTZ 10-12.5 mg was increased to 20-25 mg.  In the ER, EKG was nonischemic, troponin normal.  MRI showed no evidence for new stroke.  He has a history of CKD stage III, but was noted to have an ROSALIA with a bump in his creatinine to 2.16, baseline is closer to 1.6-1.7.  He was given some IV fluids and advised on close follow-up with PCP.    Patient is here with 2 family members today.  He has been checking his blood pressure a few times per day.  He reports it is usually elevated with a systolic in the 150s.  He denies any new concerns.  Family member notes he does like coffee, does not watch salt intake.        Review of Systems   Constitutional, HEENT, cardiovascular, pulmonary, gi and gu systems are negative, except as otherwise noted.      Objective    BP (!) 150/78   Pulse 66   Ht 1.702 m (5' 7\")   Wt 85.7 kg (189 lb)   SpO2 98%   BMI 29.60 kg/m    Body mass index is 29.6 kg/m .  Physical Exam   GENERAL: healthy, alert and no distress  EYES: Eyes grossly normal to inspection, PERRL and conjunctivae and sclerae normal  RESP: lungs clear to auscultation - no rales, rhonchi or wheezes  CV: regular rate and rhythm, normal S1 S2, no S3 or S4, no murmur, click or rub, no peripheral edema and peripheral pulses strong  MS: no gross musculoskeletal defects noted, no edema  SKIN: no suspicious lesions or rashes  NEURO: Normal strength and tone, mentation intact and speech normal    Labs reviewed in Care Everywhere            "

## 2022-02-18 NOTE — TELEPHONE ENCOUNTER
Reason for Call:  Medication or medication refill:    Do you use a [facility]?  Name of the pharmacy and phone number for the current request:  [facility] [phone]    Name of the medication requested: predniSONE (DELTASONE) 5 MG tablet    Other request: n/a    Can we leave a detailed message on this number? YES    Phone number patient can be reached at: Home number on file [phone] (home)    Best Time: Any    Call taken on 2/18/2022 at 12:24 PM by Alvin Alvarez

## 2022-02-18 NOTE — TELEPHONE ENCOUNTER
Patient takes prescription for rheumatoid arthritis, was prescribed by rheumatologist but has not been seen for over a year    To provider to advise    Eula MALDONADON, RN

## 2022-02-18 NOTE — PATIENT INSTRUCTIONS
Blood pressure:   Add hydralazine 25 mg twice daily.   Your other blood pressure medications should be amlodipine 10 mg and lisinopril-hydrochlorothiazide 20-25 mg.   Return for clinic BP check in the near future.     Your only need to check your blood pressure once per day at home. Make sure it is 1 hour after taking your medications and you have been sitting for at least 15 minutes.       Labs today to follow-up on kidney function.

## 2022-02-21 ENCOUNTER — TELEPHONE (OUTPATIENT)
Dept: INTERNAL MEDICINE | Facility: CLINIC | Age: 82
End: 2022-02-21
Payer: COMMERCIAL

## 2022-02-21 DIAGNOSIS — E87.5 HYPERKALEMIA: Primary | ICD-10-CM

## 2022-02-21 DIAGNOSIS — I10 HYPERTENSION GOAL BP (BLOOD PRESSURE) < 140/90: ICD-10-CM

## 2022-02-21 DIAGNOSIS — E87.5 HYPERKALEMIA: ICD-10-CM

## 2022-02-21 DIAGNOSIS — N18.4 CHRONIC KIDNEY DISEASE, STAGE 4 (SEVERE) (H): Primary | ICD-10-CM

## 2022-02-21 DIAGNOSIS — I10 ESSENTIAL HYPERTENSION WITH GOAL BLOOD PRESSURE LESS THAN 140/90: ICD-10-CM

## 2022-02-21 NOTE — TELEPHONE ENCOUNTER
Patient notified of provider's message as written below.  The patient was warm transferred to central scheduling and they were asked to assist patient in making an appointment in 1 week for lab and a MA BP check at the patient's preferred location. They verified they understood..  He was given the number for nephrology to schedule that appointment. He was able to repeat the plan and information back to me. Patient verbalized good understanding, had no further questions and needed no further support.Gloria Butler R.N.    eferred To     Melissa Canchola, CNP   [address] [facility] [address]   Phone: [phone]   Fax: [phone]    Diagnoses: Chronic kidney disease, stage 4 (severe) (H)   Hyperkalemia   Essential hypertension with goal blood pressure less than 140/90   Order: Adult Nephrology [address] [address]   Phone: [phone]   Fax: [phone]

## 2022-02-21 NOTE — TELEPHONE ENCOUNTER
Please contact patient regarding lab results, change in medication, follow-up and referral.       Kidney function is slightly worse than when you left the hospital.  Potassium is increased.  I would like you to stop the lisinopril-hydrochlorothiazide medication.  Stop potassium supplement.  Recheck labs and blood pressure in 1 week- needs ancillary appointment.  Referral to nephrology placed.   May need to adjust other medications given we are stopping one of his pills.      Melissa Canchola, CNP

## 2022-03-04 ENCOUNTER — LAB (OUTPATIENT)
Dept: LAB | Facility: CLINIC | Age: 82
End: 2022-03-04
Payer: COMMERCIAL

## 2022-03-04 ENCOUNTER — ALLIED HEALTH/NURSE VISIT (OUTPATIENT)
Dept: FAMILY MEDICINE | Facility: CLINIC | Age: 82
End: 2022-03-04
Payer: COMMERCIAL

## 2022-03-04 VITALS — HEART RATE: 65 BPM | SYSTOLIC BLOOD PRESSURE: 126 MMHG | DIASTOLIC BLOOD PRESSURE: 55 MMHG

## 2022-03-04 DIAGNOSIS — E87.5 HYPERKALEMIA: ICD-10-CM

## 2022-03-04 DIAGNOSIS — I10 ESSENTIAL HYPERTENSION WITH GOAL BLOOD PRESSURE LESS THAN 140/90: Primary | ICD-10-CM

## 2022-03-04 DIAGNOSIS — I10 HYPERTENSION GOAL BP (BLOOD PRESSURE) < 140/90: ICD-10-CM

## 2022-03-04 LAB
ANION GAP SERPL CALCULATED.3IONS-SCNC: 10 MMOL/L (ref 3–14)
BUN SERPL-MCNC: 93 MG/DL (ref 7–30)
CALCIUM SERPL-MCNC: 9.2 MG/DL (ref 8.5–10.1)
CHLORIDE BLD-SCNC: 108 MMOL/L (ref 94–109)
CO2 SERPL-SCNC: 19 MMOL/L (ref 20–32)
CREAT SERPL-MCNC: 3.07 MG/DL (ref 0.66–1.25)
GFR SERPL CREATININE-BSD FRML MDRD: 20 ML/MIN/1.73M2
GLUCOSE BLD-MCNC: 124 MG/DL (ref 70–99)
POTASSIUM BLD-SCNC: 4.7 MMOL/L (ref 3.4–5.3)
SODIUM SERPL-SCNC: 137 MMOL/L (ref 133–144)

## 2022-03-04 PROCEDURE — 36415 COLL VENOUS BLD VENIPUNCTURE: CPT

## 2022-03-04 PROCEDURE — 99207 PR NO CHARGE NURSE ONLY: CPT

## 2022-03-04 PROCEDURE — 80048 BASIC METABOLIC PNL TOTAL CA: CPT

## 2022-03-04 NOTE — PROGRESS NOTES
I met with Kenton Townsend at the request of Jesika to recheck his blood pressure.  Blood pressure medications on the med list were reviewed with patient.    Patient has taken all medications as per usual regimen: Yes  Patient reports tolerating them without any issues or concerns: No    There were no vitals filed for this visit.    Blood pressure was taken, previous encounter was reviewed, recorded blood pressure below 140/90.  Patient was discharged and the note will be sent to the provider for final review.    Some Readings at home  141/61, 150/78    Rosangela Curtis cma

## 2022-03-06 ENCOUNTER — TELEPHONE (OUTPATIENT)
Dept: INTERNAL MEDICINE | Facility: CLINIC | Age: 82
End: 2022-03-06
Payer: COMMERCIAL

## 2022-03-06 NOTE — TELEPHONE ENCOUNTER
Please call patient with labs, wondering if he has scheduled with nephrology?   Potassium is back in the normal range.  Kidney function has worsened. Continue to hold the medications I previous asked him to hold (potassium, lisinopril-hydrochlorothiazide).   I referred him to nephrology back on 2/21, but from what I can tell he has not been scheduled.  Can we please check with him?  Melissa Canchola, CNP

## 2022-03-07 NOTE — TELEPHONE ENCOUNTER
Pt notified of provider message as written.  Pt verbalized good understanding.  Pt given scheduling number for nephrologist, he will call and make an appointment.  Estela MALDONADON, RN

## 2022-03-08 ENCOUNTER — TELEPHONE (OUTPATIENT)
Dept: FAMILY MEDICINE | Facility: CLINIC | Age: 82
End: 2022-03-08
Payer: COMMERCIAL

## 2022-03-12 DIAGNOSIS — I10 HYPERTENSION GOAL BP (BLOOD PRESSURE) < 140/90: ICD-10-CM

## 2022-03-13 ENCOUNTER — HEALTH MAINTENANCE LETTER (OUTPATIENT)
Age: 82
End: 2022-03-13

## 2022-03-14 RX ORDER — HYDRALAZINE HYDROCHLORIDE 25 MG/1
TABLET, FILM COATED ORAL
Qty: 180 TABLET | Refills: 0 | Status: SHIPPED | OUTPATIENT
Start: 2022-03-14 | End: 2022-04-14

## 2022-03-21 DIAGNOSIS — I10 HYPERTENSION GOAL BP (BLOOD PRESSURE) < 140/90: ICD-10-CM

## 2022-03-21 RX ORDER — HYDRALAZINE HYDROCHLORIDE 25 MG/1
TABLET, FILM COATED ORAL
Qty: 180 TABLET | Refills: 0 | OUTPATIENT
Start: 2022-03-21

## 2022-03-28 DIAGNOSIS — N52.9 ERECTILE DYSFUNCTION, UNSPECIFIED ERECTILE DYSFUNCTION TYPE: ICD-10-CM

## 2022-03-29 RX ORDER — SILDENAFIL CITRATE 20 MG/1
TABLET ORAL
Qty: 30 TABLET | Refills: 0 | Status: SHIPPED | OUTPATIENT
Start: 2022-03-29 | End: 2022-06-20

## 2022-04-11 ENCOUNTER — LAB (OUTPATIENT)
Dept: LAB | Facility: CLINIC | Age: 82
End: 2022-04-11
Payer: COMMERCIAL

## 2022-04-11 DIAGNOSIS — I10 ESSENTIAL HYPERTENSION WITH GOAL BLOOD PRESSURE LESS THAN 140/90: ICD-10-CM

## 2022-04-11 DIAGNOSIS — I63.9 CEREBROVASCULAR ACCIDENT (CVA), UNSPECIFIED MECHANISM (H): ICD-10-CM

## 2022-04-11 DIAGNOSIS — E78.5 HYPERLIPIDEMIA LDL GOAL <100: ICD-10-CM

## 2022-04-11 LAB
ALBUMIN SERPL-MCNC: 3.5 G/DL (ref 3.4–5)
ANION GAP SERPL CALCULATED.3IONS-SCNC: 7 MMOL/L (ref 3–14)
BUN SERPL-MCNC: 31 MG/DL (ref 7–30)
CALCIUM SERPL-MCNC: 9.2 MG/DL (ref 8.5–10.1)
CHLORIDE BLD-SCNC: 117 MMOL/L (ref 94–109)
CHOLEST SERPL-MCNC: 117 MG/DL
CO2 SERPL-SCNC: 22 MMOL/L (ref 20–32)
CREAT SERPL-MCNC: 1.56 MG/DL (ref 0.66–1.25)
ERYTHROCYTE [DISTWIDTH] IN BLOOD BY AUTOMATED COUNT: 15 % (ref 10–15)
FASTING STATUS PATIENT QL REPORTED: YES
GFR SERPL CREATININE-BSD FRML MDRD: 44 ML/MIN/1.73M2
GLUCOSE BLD-MCNC: 108 MG/DL (ref 70–99)
HCT VFR BLD AUTO: 39.1 % (ref 40–53)
HDLC SERPL-MCNC: 32 MG/DL
HGB BLD-MCNC: 12.7 G/DL (ref 13.3–17.7)
LDLC SERPL CALC-MCNC: 59 MG/DL
MCH RBC QN AUTO: 31.7 PG (ref 26.5–33)
MCHC RBC AUTO-ENTMCNC: 32.5 G/DL (ref 31.5–36.5)
MCV RBC AUTO: 98 FL (ref 78–100)
NONHDLC SERPL-MCNC: 85 MG/DL
PHOSPHATE SERPL-MCNC: 2.8 MG/DL (ref 2.5–4.5)
PLATELET # BLD AUTO: 107 10E3/UL (ref 150–450)
POTASSIUM BLD-SCNC: 4.6 MMOL/L (ref 3.4–5.3)
RBC # BLD AUTO: 4.01 10E6/UL (ref 4.4–5.9)
SODIUM SERPL-SCNC: 146 MMOL/L (ref 133–144)
TRIGL SERPL-MCNC: 132 MG/DL
WBC # BLD AUTO: 3 10E3/UL (ref 4–11)

## 2022-04-11 PROCEDURE — 36415 COLL VENOUS BLD VENIPUNCTURE: CPT

## 2022-04-11 PROCEDURE — 80069 RENAL FUNCTION PANEL: CPT

## 2022-04-11 PROCEDURE — 80061 LIPID PANEL: CPT

## 2022-04-11 PROCEDURE — 85027 COMPLETE CBC AUTOMATED: CPT

## 2022-04-14 ENCOUNTER — OFFICE VISIT (OUTPATIENT)
Dept: FAMILY MEDICINE | Facility: CLINIC | Age: 82
End: 2022-04-14
Payer: COMMERCIAL

## 2022-04-14 VITALS
OXYGEN SATURATION: 99 % | DIASTOLIC BLOOD PRESSURE: 68 MMHG | TEMPERATURE: 98.6 F | RESPIRATION RATE: 24 BRPM | SYSTOLIC BLOOD PRESSURE: 138 MMHG | BODY MASS INDEX: 29.29 KG/M2 | WEIGHT: 187 LBS | HEART RATE: 62 BPM

## 2022-04-14 DIAGNOSIS — N18.2 CHRONIC RENAL INSUFFICIENCY, STAGE II (MILD): Primary | ICD-10-CM

## 2022-04-14 DIAGNOSIS — D63.1 ANEMIA OF CHRONIC RENAL FAILURE, STAGE 2 (MILD): ICD-10-CM

## 2022-04-14 DIAGNOSIS — M05.9 RHEUMATOID ARTHRITIS WITH POSITIVE RHEUMATOID FACTOR, INVOLVING UNSPECIFIED SITE (H): ICD-10-CM

## 2022-04-14 DIAGNOSIS — N18.2 ANEMIA OF CHRONIC RENAL FAILURE, STAGE 2 (MILD): ICD-10-CM

## 2022-04-14 DIAGNOSIS — I10 HYPERTENSION GOAL BP (BLOOD PRESSURE) < 140/90: ICD-10-CM

## 2022-04-14 DIAGNOSIS — E78.5 HYPERLIPIDEMIA LDL GOAL <100: ICD-10-CM

## 2022-04-14 PROCEDURE — 99214 OFFICE O/P EST MOD 30 MIN: CPT | Performed by: FAMILY MEDICINE

## 2022-04-14 RX ORDER — HYDRALAZINE HYDROCHLORIDE 25 MG/1
TABLET, FILM COATED ORAL
Qty: 180 TABLET | Refills: 1 | Status: SHIPPED | OUTPATIENT
Start: 2022-04-14 | End: 2022-07-01

## 2022-04-14 RX ORDER — ATORVASTATIN CALCIUM 40 MG/1
40 TABLET, FILM COATED ORAL DAILY
Qty: 90 TABLET | Refills: 3 | Status: SHIPPED | OUTPATIENT
Start: 2022-04-14 | End: 2023-05-08

## 2022-04-14 RX ORDER — PREDNISONE 5 MG/1
TABLET ORAL
Qty: 30 TABLET | Refills: 0 | Status: SHIPPED | OUTPATIENT
Start: 2022-04-14 | End: 2022-04-14

## 2022-04-14 RX ORDER — LISINOPRIL 5 MG/1
5 TABLET ORAL DAILY
Qty: 90 TABLET | Refills: 1 | Status: SHIPPED | OUTPATIENT
Start: 2022-04-14 | End: 2022-06-21

## 2022-04-14 RX ORDER — AMLODIPINE BESYLATE 10 MG/1
10 TABLET ORAL DAILY
Qty: 90 TABLET | Refills: 1 | Status: SHIPPED | OUTPATIENT
Start: 2022-04-14 | End: 2022-10-10

## 2022-04-14 RX ORDER — PREDNISONE 5 MG/1
5 TABLET ORAL DAILY
Qty: 90 TABLET | Refills: 1 | Status: SHIPPED | OUTPATIENT
Start: 2022-04-14 | End: 2022-10-31

## 2022-04-14 NOTE — LETTER
April 14, 2022    Kenton Townsend  [address]  [place] [address]    Dear Kenton,       We recently received a refill request for predniSONE (DELTASONE) 5 MG tablet.  We have refilled this for a one time 30 day supply only because you are due for a:    Medication check office visit      Please call at your earliest convenience so that there will not be a delay with your future refills.          Thank you,   Your [facility] Team/  [phone]

## 2022-04-14 NOTE — PROGRESS NOTES
SUBJECTIVE:  Kenton Townsend, a 81 year old male scheduled an appointment to discuss the following issues:  Fh cva, cri, anemia, RA, high cholesterol and htn.   Taking prednisone everyday - stable RA. No rheumatology about 1 year ago.   Taking vitaminD. No winter vacations.   Off plavix. No black/bloody stools. Energy level doing ok.   No chest pain or shortness of breath. Limited exercise. No nausea, vomiting or diarrhea. No urine changes. Emotionally doing.   Blood pressure running higher in pm.   Water intake improving. On lisinopril in past no issues.   Medical, social, surgical, and family histories reviewed.    ROS:  All other ROS negative  OBJECTIVE:  /68   Pulse 62   Temp 98.6  F (37  C) (Oral)   Resp 24   Wt 84.8 kg (187 lb)   SpO2 99%   BMI 29.29 kg/m   EXAM:  GENERAL APPEARANCE: healthy, alert and no distress  EYES: EOMI,  PERRL  HENT: ear canals and TM's normal and nose and mouth without ulcers or lesions  NECK: no adenopathy, no asymmetry, masses, or scars and thyroid normal to palpation  RESP: lungs clear to auscultation - no rales, rhonchi or wheezes  CV: regular rates and rhythm, normal S1 S2, no S3 or S4 and no murmur, click or rub -  ABDOMEN:  soft, nontender, no HSM or masses and bowel sounds normal  MS: extremities normal- no gross deformities noted, no evidence of inflammation in joints, FROM in all extremities.  PSYCH: mentation appears normal and affect normal/bright    ASSESSMENT / PLAN:  (N18.2) Chronic renal insufficiency, stage II (mild)  (primary encounter diagnosis)  Comment: improving  Plan: continue lots of water. Seeing nephrology in a couple months.     (I10) Hypertension goal BP (blood pressure) < 140/90  Comment: a little high  Plan: amLODIPine (NORVASC) 10 MG tablet, hydrALAZINE         (APRESOLINE) 25 MG tablet, lisinopril (ZESTRIL)        5 MG tablet        Add back lower dosage of lisinopril in AM and norvasc in AM. Recheck in 6 months  Sooner if wores.      (M05.9) Rheumatoid arthritis with positive rheumatoid factor, involving unspecified site (H)  Comment: stable  Plan: predniSONE (DELTASONE) 5 MG tablet        Back to rheum if worse. Patient ok with continue prednsisone. Lower carb diet and vitaminD    (N18.2,  D63.1) Anemia of chronic renal failure, stage 2 (mild)  Comment: a little worse  Plan: Recheck in 6 months  Sooner if worsening fatigue/ abdominal pain or nausea, vomiting or diarrhea/black/bloody stools. Consider pepcid for gastritis? Off plavix now.     (E78.5) Hyperlipidemia LDL goal <100  Comment: stable  Plan: atorvastatin (LIPITOR) 40 MG tablet        Chest pain or shortness of breath to er.     Geovanni Vincent MD

## 2022-04-14 NOTE — TELEPHONE ENCOUNTER
Routing refill request to provider for review/approval because:  Drug not on the [facility] refill protocol     Eula MALDONADON, RN

## 2022-04-28 DIAGNOSIS — I10 ESSENTIAL HYPERTENSION WITH GOAL BLOOD PRESSURE LESS THAN 140/90: ICD-10-CM

## 2022-04-28 RX ORDER — LISINOPRIL/HYDROCHLOROTHIAZIDE 10-12.5 MG
TABLET ORAL
Qty: 90 TABLET | Refills: 0 | OUTPATIENT
Start: 2022-04-28

## 2022-04-28 NOTE — TELEPHONE ENCOUNTER
Please confirm how many tabs he is taking.  Also make sure he keeps appt with kidney doctor i see he has one. Alondra Red PA-C

## 2022-04-28 NOTE — TELEPHONE ENCOUNTER
"Routing refill request to provider for review/approval because:  Requested Prescriptions   Pending Prescriptions Disp Refills    lisinopril-hydrochlorothiazide (ZESTORETIC) 10-12.5 MG tablet [Pharmacy Med Name: LISINOPRIL-HCTZ 10-12.5 MG TAB] 90 tablet 0     Sig: TAKE 1 TABLET BY MOUTH EVERY DAY        Diuretics (Including Combos) Protocol Failed - 4/28/2022 12:33 AM        Failed - Medication is active on med list        Failed - Normal serum creatinine on file in past 12 months       Recent Labs   Lab Test 04/11/22  1242   CR 1.56*              Failed - Normal serum sodium on file in past 12 months       Recent Labs   Lab Test 04/11/22  1242   *              Passed - Blood pressure under 140/90 in past 12 months       BP Readings from Last 3 Encounters:   04/14/22 138/68   03/04/22 126/55   02/18/22 (!) 150/78                 Passed - Recent (12 mo) or future (30 days) visit within the authorizing provider's specialty     Patient has had an office visit with the authorizing provider or a provider within the authorizing providers department within the previous 12 mos or has a future within next 30 days. See \"Patient Info\" tab in inbasket, or \"Choose Columns\" in Meds & Orders section of the refill encounter.              Passed - Patient is age 18 or older        Passed - Normal serum potassium on file in past 12 months       Recent Labs   Lab Test 04/11/22  1242   POTASSIUM 4.6                   ACE Inhibitors (Including Combos) Protocol Failed - 4/28/2022 12:33 AM        Failed - Medication is active on med list        Failed - Normal serum creatinine on file in past 12 months     Recent Labs   Lab Test 04/11/22  1242   CR 1.56*       Ok to refill medication if creatinine is low          Passed - Blood pressure under 140/90 in past 12 months       BP Readings from Last 3 Encounters:   04/14/22 138/68   03/04/22 126/55   02/18/22 (!) 150/78                 Passed - Recent (12 mo) or future (30 days) visit " "within the authorizing provider's specialty     Patient has had an office visit with the authorizing provider or a provider within the authorizing providers department within the previous 12 mos or has a future within next 30 days. See \"Patient Info\" tab in inbasket, or \"Choose Columns\" in Meds & Orders section of the refill encounter.              Passed - Patient is age 18 or older        Passed - Normal serum potassium on file in past 12 months     Recent Labs   Lab Test 04/11/22  1242   POTASSIUM 4.6                       Eula MALDONADON, RN        "

## 2022-04-29 NOTE — TELEPHONE ENCOUNTER
I called and spoke with the patient and he is stating that he does not need a refill at this time.  He will also keep his kidney doctor appointment.  Baylee Franco

## 2022-05-10 ENCOUNTER — TRANSFERRED RECORDS (OUTPATIENT)
Dept: HEALTH INFORMATION MANAGEMENT | Facility: CLINIC | Age: 82
End: 2022-05-10
Payer: COMMERCIAL

## 2022-05-19 ENCOUNTER — OFFICE VISIT (OUTPATIENT)
Dept: FAMILY MEDICINE | Facility: CLINIC | Age: 82
End: 2022-05-19
Payer: COMMERCIAL

## 2022-05-19 ENCOUNTER — NURSE TRIAGE (OUTPATIENT)
Dept: FAMILY MEDICINE | Facility: CLINIC | Age: 82
End: 2022-05-19
Payer: COMMERCIAL

## 2022-05-19 VITALS
TEMPERATURE: 98.2 F | OXYGEN SATURATION: 96 % | BODY MASS INDEX: 28.88 KG/M2 | HEART RATE: 73 BPM | DIASTOLIC BLOOD PRESSURE: 80 MMHG | HEIGHT: 67 IN | SYSTOLIC BLOOD PRESSURE: 180 MMHG | WEIGHT: 184 LBS

## 2022-05-19 DIAGNOSIS — M54.50 ACUTE MIDLINE LOW BACK PAIN WITHOUT SCIATICA: Primary | ICD-10-CM

## 2022-05-19 DIAGNOSIS — I71.40 ABDOMINAL AORTIC ANEURYSM (AAA) WITHOUT RUPTURE (H): ICD-10-CM

## 2022-05-19 PROCEDURE — 99213 OFFICE O/P EST LOW 20 MIN: CPT | Performed by: FAMILY MEDICINE

## 2022-05-19 RX ORDER — CYCLOBENZAPRINE HCL 5 MG
5 TABLET ORAL AT BEDTIME
Qty: 10 TABLET | Refills: 0 | Status: SHIPPED | OUTPATIENT
Start: 2022-05-19 | End: 2022-06-01

## 2022-05-19 ASSESSMENT — PAIN SCALES - GENERAL: PAINLEVEL: SEVERE PAIN (7)

## 2022-05-19 NOTE — TELEPHONE ENCOUNTER
Patient reports about 1.5 weeks ago he was getting his scooter out of storage and fell on the stairs with it.  He did not fall elda the stairs it fell on him and he hurts his lower back.  It is not getting any better.  Pain is rated at about 7-8/10. He has tried Icy Hot and wearing a back brace.He has been taking acetaminophen extra strength  2 tablets - 6 in a day.      Denies: trouble urinating, breathing, inability to walk, numbness    Nursing advice:  Patient to be seen in clinic.  They were assisted in making an appointment as list below. Patient verbalized good understanding, agrees with plan and needs no further support.  Thank you. Gloria Butler R.N.    Next 5 appointments (look out 90 days)    May 19, 2022  4:00 PM  (Arrive by 3:40 PM)  Provider Visit with Edwar Coelho MD  [facility] ([facility] ) [address] [facility] [address] [phone]          Reason for Disposition    Age > 50 and no history of prior similar back pain    Additional Information    Negative: Passed out (i.e., fainted, collapsed and was not responding)    Negative: Shock suspected (e.g., cold/pale/clammy skin, too weak to stand, low BP, rapid pulse)    Negative: Sounds like a life-threatening emergency to the triager    Negative: SEVERE back pain of sudden onset and age > 60    Negative: SEVERE abdominal pain (e.g., excruciating)    Negative: Abdominal pain and age > 60    Negative: Unable to urinate (or only a few drops) and bladder feels very full    Negative: Loss of bladder or bowel control (urine or bowel incontinence; wetting self, leaking stool) of new onset    Negative: Numbness (loss of sensation) in groin or rectal area    Negative: Pain radiates into groin, scrotum    Negative: Blood in urine (red, pink, or tea-colored)    Negative: Vomiting and pain over lower ribs of back (i.e., flank - kidney area)    Negative: Weakness of a leg or foot (e.g., unable to bear  weight, dragging foot)    Negative: Patient sounds very sick or weak to the triager    Negative: Fever > 100.4 F (38.0 C) and flank pain    Negative: Pain or burning with passing urine (urination)    Negative: SEVERE back pain (e.g., excruciating, unable to do any normal activities) and not improved after pain medicine and CARE ADVICE    Negative: Numbness in an arm or hand (i.e., loss of sensation) and upper back pain    Negative: Numbness in a leg or foot (i.e., loss of sensation)    Negative: High-risk adult (e.g., history of cancer, history of HIV, or history of IV drug abuse)    Negative: Painful rash with multiple small blisters grouped together (i.e., dermatomal distribution or 'band' or 'stripe')    Negative: Pain radiates into the thigh or further down the leg, and in both legs    Protocols used: BACK PAIN-A-OH

## 2022-05-19 NOTE — PROGRESS NOTES
"  Assessment & Plan     Acute midline low back pain without sciatica   Lower back pain started after falling backward  I had a discussion with the patient about his condition , diagnosis treatment options. We discussed diffenetial diagnosis, and expected course.    I recommend the following :  1. Patient education: In depth discussion and education was provided about the assessment and implications of each of the below recommendations for management. Patient indicated readiness to learn, all questions were answered and understanding of material presented was confirmed.  2. Work-up: x ray lumbar spine tp r/o fracture   3. Therapy/equipment/braces: Back brace  4. Medications: Flexeril, Tylenol, and Aspercreme   5. Interventions: None  6. Referral / follow up with other providers: PT  7. Follow up:   3 months     - XR Lumbar Spine 2/3 Views; Future  - cyclobenzaprine (FLEXERIL) 5 MG tablet; Take 1 tablet (5 mg) by mouth At Bedtime  - Physical Therapy Referral; Future             BMI:   Estimated body mass index is 28.82 kg/m  as calculated from the following:    Height as of this encounter: 1.702 m (5' 7\").    Weight as of this encounter: 83.5 kg (184 lb).           Return in about 3 months (around 8/19/2022).    Edwar Coelho MD  [facility]    Adeola Fung is a 81 year old who presents for the following health issues     History of Present Illness       Back Pain:  He presents for follow up of back pain. Patient's back pain is a new problem.    Original cause of back pain: a fall  First noticed back pain: 1-4 weeks ago  Patient feels back pain: constantlyLocation of back pain:  Right lower back and left lower back  Description of back pain: sharp and other  Back pain spreads: nowhere    Since patient first noticed back pain, pain is: unchanged  Does back pain interfere with his job:  Not applicable  On a scale of 1-10 (10 being the worst), patient describes pain as:  8  What makes back " "pain worse: bending, lying down and twisting  Acupuncture: not tried  Acetaminophen: not helpful  Activity or exercise: not helpful  Cold: not helpful  Heat: not helpful  Massage: not tried  Muscle relaxants: not tried  NSAIDS: not helpful  Opioids: not tried  Physical Therapy: not tried  Rest: not helpful  Steroid Injection: not tried  Stretching: not tried  Surgery: not tried  TENS unit: not tried  Topical pain relievers: not helpful  Other healthcare providers patient is seeing for back pain: None    He eats 0-1 servings of fruits and vegetables daily.He consumes 0 sweetened beverage(s) daily.He exercises with enough effort to increase his heart rate 9 or less minutes per day.  He exercises with enough effort to increase his heart rate 3 or less days per week.   He is taking medications regularly.     Scoter fell on him backward   Pain started next day   Pain is about the same   Tried icy hot and hot pad.tylenol   Pain 7-8/10 , prolonged sitting or standing   No numbness , no weakness.pain is non radiating         Review of Systems   Constitutional, HEENT, cardiovascular, pulmonary, gi and gu systems are negative, except as otherwise noted.      Objective    BP (!) 180/80   Pulse 73   Temp 98.2  F (36.8  C) (Tympanic)   Ht 1.702 m (5' 7\")   Wt 83.5 kg (184 lb)   SpO2 96%   BMI 28.82 kg/m    Body mass index is 28.82 kg/m .  Physical Exam  Vitals and nursing note reviewed.   Constitutional:       General: He is not in acute distress.     Appearance: Normal appearance. He is not ill-appearing, toxic-appearing or diaphoretic.   Musculoskeletal:      Lumbar back: No swelling, edema, deformity, signs of trauma, lacerations, spasms, tenderness or bony tenderness. Normal range of motion. Negative right straight leg raise test and negative left straight leg raise test. No scoliosis.   Neurological:      Mental Status: He is alert.                        "

## 2022-05-20 ENCOUNTER — TELEPHONE (OUTPATIENT)
Dept: OTHER | Facility: CLINIC | Age: 82
End: 2022-05-20
Payer: COMMERCIAL

## 2022-05-20 ENCOUNTER — TELEPHONE (OUTPATIENT)
Dept: FAMILY MEDICINE | Facility: CLINIC | Age: 82
End: 2022-05-20
Payer: COMMERCIAL

## 2022-05-20 DIAGNOSIS — I71.40 AAA (ABDOMINAL AORTIC ANEURYSM) (H): Primary | ICD-10-CM

## 2022-05-20 NOTE — TELEPHONE ENCOUNTER
Pt referred to [facility] by Edwar Coelho MD for Abdominal aortic aneurysm.    Patient has XR lumbar spine in [facility] on 5/19/22.     Pt needs to be scheduled for US aorta US and consult with vascular surgery.  Will route to scheduling to coordinate an appointment at next available.    Appointment note: Referred to [facility] by Edwar Coelho MD for Abdominal aortic aneurysm. Needs AAA US prior.         Suly MALDONADON, RN    [facility]  Office: [phone]  Fax: [phone]

## 2022-05-20 NOTE — TELEPHONE ENCOUNTER
Choua from imaging calling with 2 URGENT findings from lumbar spine x-ray:    [Access Center: Abdominal aortic aneurysm, recommend CTA for further  evaluation. Age-indeterminate fracture at L4.]      To provider to advise      Eula MALDONADON, RN

## 2022-05-21 ENCOUNTER — NURSE TRIAGE (OUTPATIENT)
Dept: NURSING | Facility: CLINIC | Age: 82
End: 2022-05-21
Payer: COMMERCIAL

## 2022-05-21 NOTE — TELEPHONE ENCOUNTER
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  X-ray showed mild compression fracture at lumbar vertebra L4  Next step is to see a spine specialist.  Referral placed  2.  Incidental finding of abdominal aortic aneurysm -unclear size  Neck step is to do either abdominal ultrasound and or CT angiography or CTA -and referral to vascular surgeon  I think we should start with an ultrasound first because it is noninvasive and does not require contrast.  CTA requires contrast which can make your kidney function worse  I also placed a referral to a vascular surgeon  Caller was advised t to have son contact nurse from  Vascular clinic for assitance in setting up appointment  And provided contact number  Suly Mac, RN   Registered Nurse      Telephone Encounter   Signed   Encounter Date:  5/20/2022                       []Hide copied text    [x]Hover for details  Pt referred to [facility] by Edwar Coelho MD for Abdominal aortic aneurysm.     Patient has XR lumbar spine in [facility] on 5/19/22.      Pt needs to be scheduled for US aorta US and consult with vascular surgery.  Will route to scheduling to coordinate an appointment at next available.     Appointment note: [facility] Ed[facility] Coelho [facility] for Abdominal aortic aneurysm. Needs AAA US prior.            Suly MALDONADON, RN     [facility]  Office: [phone]  Fax: [phone]                Routing  to Vascular clinic [place] nurse   to anticipate  call        Reason for Disposition    [1] Follow-up call to recent contact AND [2] information only call, no triage required    Protocols used: INFORMATION ONLY CALL - NO TRIAGE-A-

## 2022-05-23 NOTE — TELEPHONE ENCOUNTER
Patient is not yet established with [facility]. Patient is a new consult to [facility]. Patients son is not authorized in chart and therefore we are not able to call to schedule. Patient will need to call [facility] to schedule new consult.    Suly SORENSON, RN    [facility]  Office: [phone]  Fax: [phone]

## 2022-05-25 NOTE — TELEPHONE ENCOUNTER
Spoke with son Royce.  His father has an appointment on 5/31/22 at [facility] in [place] with a Vascular Provider Dr. Manav Ramirez.  He has been seen there before.      No appointment will be made at [facility].

## 2022-05-25 NOTE — TELEPHONE ENCOUNTER
Spoke with patient.  He took our phone number and will have his son (Royce) or daughter-in-law (Lula) call us to make the appointments.

## 2022-05-27 ENCOUNTER — OFFICE VISIT (OUTPATIENT)
Dept: URGENT CARE | Facility: URGENT CARE | Age: 82
End: 2022-05-27
Payer: COMMERCIAL

## 2022-05-27 ENCOUNTER — NURSE TRIAGE (OUTPATIENT)
Dept: NURSING | Facility: CLINIC | Age: 82
End: 2022-05-27
Payer: COMMERCIAL

## 2022-05-27 VITALS
WEIGHT: 185 LBS | SYSTOLIC BLOOD PRESSURE: 194 MMHG | DIASTOLIC BLOOD PRESSURE: 90 MMHG | TEMPERATURE: 98.5 F | RESPIRATION RATE: 20 BRPM | HEART RATE: 77 BPM | OXYGEN SATURATION: 98 % | BODY MASS INDEX: 28.98 KG/M2

## 2022-05-27 DIAGNOSIS — M47.816 SPONDYLOSIS OF LUMBAR REGION WITHOUT MYELOPATHY OR RADICULOPATHY: Primary | ICD-10-CM

## 2022-05-27 PROCEDURE — 99213 OFFICE O/P EST LOW 20 MIN: CPT | Performed by: PHYSICIAN ASSISTANT

## 2022-05-27 RX ORDER — HYDROCODONE BITARTRATE AND ACETAMINOPHEN 5; 325 MG/1; MG/1
1 TABLET ORAL EVERY 6 HOURS PRN
Qty: 18 TABLET | Refills: 0 | Status: SHIPPED | OUTPATIENT
Start: 2022-05-27 | End: 2022-06-20

## 2022-05-27 NOTE — TELEPHONE ENCOUNTER
Patient hurt his back in the first part of May. Patient is still having pain back there and nothing that he has been helpful to reduce the pain.  Muscle relaxers were prescribed before but not helpful. Low back pain rating 8-9/10. Pain radiates down both legs. Patient unable to stand, sit, or lie down too long due to pain.   Protocol recommends see HCP within 4 hours.   Care advice given. [facility] walk in clinic available this evening   Patient agrees to present there and call back with any worsening symptoms.   Katherine Reyes RN   05/27/22 5:22 PM  [facility] Nurse Advisor  COVID 19 Nurse Triage Plan/Patient Instructions    Please be aware that novel coronavirus (COVID-19) may be circulating in the community. If you develop symptoms such as fever, cough, or SOB or if you have concerns about the presence of another infection including coronavirus (COVID-19), please contact your health care provider or visit [email or web address].     Disposition/Instructions    In-Person Visit with provider recommended. Reference Visit Selection Guide.    Thank you for taking steps to prevent the spread of this virus.  o Limit your contact with others.  o Wear a simple mask to cover your cough.  o Wash your hands well and often.    Resources    [facility]: About COVID-19: [email or web address]    CDC: What to Do If You're Sick: www.cdc.gov/coronavirus/2019-ncov/about/steps-when-sick.html    CDC: Ending Home Isolation: www.cdc.gov/coronavirus/2019-ncov/hcp/disposition-in-home-patients.html     CDC: Caring for Someone: www.cdc.gov/coronavirus/2019-ncov/if-you-are-sick/care-for-someone.html     [facility]: Interim Guidance for Hospital Discharge to Home: www.health.[facility].mn.us/diseases/coronavirus/hcp/hospdischarge.pdf    [facility] clinical trials (COVID-19 research studies): clinicalaffairs.[facility]/umn-clinical-trials     Below are the COVID-19 hotlines at the [place] Department of Health  ([facility]). Interpreters are available.   o For health questions: Call 805-784-5371 or 1-744.326.5308 (7 a.m. to 7 p.m.)  o For questions about schools and childcare: Call 393-867-6215 or 1-702.113.8229 (7 a.m. to 7 p.m.)     Reason for Disposition    [1] SEVERE back pain (e.g., excruciating, unable to do any normal activities) AND [2] not improved 2 hours after pain medicine    Additional Information    Negative: Passed out (i.e., lost consciousness, collapsed and was not responding)    Negative: Shock suspected (e.g., cold/pale/clammy skin, too weak to stand, low BP, rapid pulse)    Negative: Sounds like a life-threatening emergency to the triager    Negative: Major injury to the back (e.g., MVA, fall > 10 feet or 3 meters, penetrating injury, etc.)    Negative: Followed a tailbone injury    Negative: [1] Pain in the upper back over the ribs (rib cage) AND [2] radiates (travels, goes) into chest    Negative: [1] Pain in the upper back over the ribs (rib cage) AND [2] worsened by coughing (or clearly increases with breathing)    Negative: Back pain during pregnancy    Negative: Pain mainly in flank (i.e., in the side, over the lower ribs or just below the ribs)    Negative: [1] SEVERE back pain (e.g., excruciating) AND [2] sudden onset AND [3] age > 60    Negative: [1] Unable to urinate (or only a few drops) > 4 hours AND [2] bladder feels very full (e.g., palpable bladder or strong urge to urinate)    Negative: [1] Loss of bladder or bowel control (urine or bowel incontinence; wetting self, leaking stool) AND [2] new onset    Negative: Numbness in groin or rectal area (i.e., loss of sensation)    Negative: [1] SEVERE abdominal pain AND [2] present > 1 hour    Negative: [1] Abdominal pain AND [2] age > 60    Negative: Weakness of a leg or foot (e.g., unable to bear weight, dragging foot)    Negative: Unable to walk    Negative: Patient sounds very sick or weak to the triager    Protocols used: BACK PAIN-A-AH

## 2022-05-27 NOTE — PROGRESS NOTES
SUBJECTIVE:   Kenton Townsend is a 81 year old male presenting with a chief complaint of   Chief Complaint   Patient presents with     Back Pain     Back pain from a fall earlier this month. Was seen on the 19th of May for back pain. Patient is unable to sleep of the pain. Has appointment 31st for back pain. Unable to wait. Requesting something for pain management.       He is an established patient of [facility].  Patient onto steps near the first of the month.   Patient had xrays performed on 5/19 along with evaluation.  Spine specialist on 3/31.  Patient asking for pain medications.  No new symptoms.  Patient legally blind.      Treatment:  Tylenol and muscle relaxer - no help.    Review of Systems    Past Medical History:   Diagnosis Date     Glaucoma      Hypertension      rheumatoid arthritis      Family History   Problem Relation Age of Onset     Cancer Father         unknown     Cancer Brother         Hodskins      Eye Disorder Brother      Neurologic Disorder Son         vertigo     Current Outpatient Medications   Medication Sig Dispense Refill     amLODIPine (NORVASC) 10 MG tablet Take 1 tablet (10 mg) by mouth daily Take at bedtime for blood pressure 90 tablet 1     aspirin (ASA) 81 MG chewable tablet Take 81 mg by mouth       atorvastatin (LIPITOR) 40 MG tablet Take 1 tablet (40 mg) by mouth daily For cholesterol 90 tablet 3     brimonidine (ALPHAGAN) 0.2 % ophthalmic solution At Bedtime       brimonidine-timolol (COMBIGAN) 0.2-0.5 % ophthalmic solution 1 drop 2 times daily.       Cholecalciferol (VITAMIN D-3) 25 MCG (1000 UT) CAPS        hydrALAZINE (APRESOLINE) 25 MG tablet TAKE 1 TABLET BY MOUTH TWICE A DAY for blood pressure 180 tablet 1     HYDROcodone-acetaminophen (NORCO) 5-325 MG tablet Take 1 tablet by mouth every 6 hours as needed for pain 18 tablet 0     lisinopril (ZESTRIL) 5 MG tablet Take 1 tablet (5 mg) by mouth daily For blood pressure in AM 90 tablet 1     predniSONE (DELTASONE) 5 MG  tablet Take 1 tablet (5 mg) by mouth daily For arthritis 90 tablet 1     TRAMADOL HCL 50 MG OR TABS        cyclobenzaprine (FLEXERIL) 5 MG tablet Take 1 tablet (5 mg) by mouth At Bedtime (Patient not taking: Reported on 2022) 10 tablet 0     PRESERVISION AREDS OR 1 tablet twice daily       sildenafil (REVATIO) 20 MG tablet TAKE 2-3 TABS BY MOUTH DAILY AS NEEDED FOR ED (Patient not taking: Reported on 2022) 30 tablet 0     Social History     Tobacco Use     Smoking status: Former Smoker     Types: Cigarettes     Quit date: 1979     Years since quittin.5     Smokeless tobacco: Never Used   Substance Use Topics     Alcohol use: Yes     Comment: couple times a week       OBJECTIVE  BP (!) 194/90 (BP Location: Left arm, Patient Position: Sitting, Cuff Size: Adult Regular)   Pulse 77   Temp 98.5  F (36.9  C) (Oral)   Resp 20   Wt 83.9 kg (185 lb)   SpO2 98%   BMI 28.98 kg/m      Physical Exam  Vitals and nursing note reviewed.   Constitutional:       Appearance: Normal appearance. He is normal weight.   Eyes:      Extraocular Movements: Extraocular movements intact.      Conjunctiva/sclera: Conjunctivae normal.   Cardiovascular:      Rate and Rhythm: Normal rate.   Neurological:      Mental Status: He is alert.         Labs:  No results found for this or any previous visit (from the past 24 hour(s)).    X-Ray was not done.   xray reviewed    ASSESSMENT:      ICD-10-CM    1. Spondylosis of lumbar region without myelopathy or radiculopathy  M47.816 HYDROcodone-acetaminophen (NORCO) 5-325 MG tablet        Medical Decision Making:    Differential Diagnosis:  Pain management    Serious Comorbid Conditions:  Adult:  reviewed    PLAN:    Rx for norco.  Discussed SE.  Keep appointment with ortho.      Followup:    If not improving or if condition worsens, follow up with your Primary Care Provider, If not improving or if conditions worsens over the next 12-24 hours, go to the Emergency  Department    There are no Patient Instructions on file for this visit.

## 2022-05-31 ENCOUNTER — OFFICE VISIT (OUTPATIENT)
Dept: NEUROSURGERY | Facility: CLINIC | Age: 82
End: 2022-05-31
Payer: COMMERCIAL

## 2022-05-31 ENCOUNTER — TELEPHONE (OUTPATIENT)
Dept: NEUROSURGERY | Facility: CLINIC | Age: 82
End: 2022-05-31

## 2022-05-31 VITALS
SYSTOLIC BLOOD PRESSURE: 162 MMHG | HEIGHT: 67 IN | WEIGHT: 176 LBS | DIASTOLIC BLOOD PRESSURE: 80 MMHG | HEART RATE: 75 BPM | BODY MASS INDEX: 27.62 KG/M2 | OXYGEN SATURATION: 98 %

## 2022-05-31 DIAGNOSIS — M54.50 ACUTE MIDLINE LOW BACK PAIN WITHOUT SCIATICA: ICD-10-CM

## 2022-05-31 DIAGNOSIS — M54.16 LUMBAR RADICULOPATHY: Primary | ICD-10-CM

## 2022-05-31 DIAGNOSIS — S32.040A COMPRESSION FRACTURE OF L4 VERTEBRA, INITIAL ENCOUNTER (H): ICD-10-CM

## 2022-05-31 PROCEDURE — 99203 OFFICE O/P NEW LOW 30 MIN: CPT | Performed by: NURSE PRACTITIONER

## 2022-05-31 ASSESSMENT — PAIN SCALES - GENERAL: PAINLEVEL: SEVERE PAIN (7)

## 2022-05-31 NOTE — PROGRESS NOTES
[facility] Neurosurgery  Neurosurgery Clinic Visit      CC: back and leg pain    Primary care Provider: Geovanni Vincent    Reason For Visit:   I was asked by Dr. Edwar Coelho to consult on the patient for acute mildine low back pain without sciatica.    HPI: Kenton Townsend is a 81 year old male who had a fall 1 month ago where his scooter landed on top of him. He has been seen in urgent care, primary care, as well as at the ED for this. He presents today for evaluation. He reports another mechanical fall recently as well. He describes low back pain that radiates to the bilateral anterior thighs. He denies paresthesias and overt weakness, however he does state his legs feel weak at times. He has a OTC brace that he has intermittently been wearing. He has had a lumbar XR and CT scan. We do not have access to the CT scan at this time. He has not had an MRI.     Pain right now:  7-8    Past Medical History:   Diagnosis Date     Glaucoma      Hypertension      rheumatoid arthritis        Past Medical History reviewed with patient during visit.    Past Surgical History:   Procedure Laterality Date     CATARACT IOL, RT/LT  2009    Cataract IOL RT/LT     JOINT REPLACEMENT, HIP RT/LT Left > 20 years ago    Joint Replacement Hip RT/LT     SURGICAL HISTORY OF -   13 years ago    humerus left     Past Surgical History reviewed with patient during visit.    Current Outpatient Medications   Medication     amLODIPine (NORVASC) 10 MG tablet     aspirin (ASA) 81 MG chewable tablet     atorvastatin (LIPITOR) 40 MG tablet     brimonidine (ALPHAGAN) 0.2 % ophthalmic solution     brimonidine-timolol (COMBIGAN) 0.2-0.5 % ophthalmic solution     Cholecalciferol (VITAMIN D-3) 25 MCG (1000 UT) CAPS     hydrALAZINE (APRESOLINE) 25 MG tablet     lisinopril (ZESTRIL) 5 MG tablet     predniSONE (DELTASONE) 5 MG tablet     PRESERVISION AREDS OR     sildenafil (REVATIO) 20 MG tablet     TRAMADOL HCL 50 MG OR TABS     cyclobenzaprine  "(FLEXERIL) 5 MG tablet     No current facility-administered medications for this visit.       Allergies   Allergen Reactions     Nkda [No Known Drug Allergies]        Social History     Socioeconomic History     Marital status:    Tobacco Use     Smoking status: Former Smoker     Types: Cigarettes     Quit date: 1979     Years since quittin.5     Smokeless tobacco: Never Used   Vaping Use     Vaping Use: Never used   Substance and Sexual Activity     Alcohol use: Yes     Comment: couple times a week     Drug use: No     Sexual activity: Yes     Partners: Female       Family History   Problem Relation Age of Onset     Cancer Father         unknown     Cancer Brother         Hodskins      Eye Disorder Brother      Neurologic Disorder Son         vertigo         ROS: 10 point ROS neg other than the symptoms noted above in the HPI.    Vital Signs:   BP (!) 162/80   Pulse 75   Ht 5' 7\" (1.702 m)   Wt 176 lb (79.8 kg)   SpO2 98%   BMI 27.57 kg/m        Examination:  Constitutional:  Alert, well nourished, NAD.  Memory: recent and remote memory   HEENT: Normocephalic, atraumatic.   Pulm:  Without shortness of breath   CV:  No pitting edema of BLE.      Neurological:  Awake  Alert  Oriented x 3  Speech clear    Motor exam:   Hip Flexion:                 Right: 5/5  Left:  5/5  Hip Abduction:             Right:  5/5  Left:  5/5  Hip Adduction:             Right:  5/5  Left:  5/5  Plantar Flexion:           Right:  5/5  Left:  5/5  Dorsal Flexion:            Right:  5/5  Left:  5/5  EHL:                            Right:  5/5  Left:  5/5     Sensation normal to bilateral upper and lower extremities  Muscle tone to bilateral upper and lower extremities   Gait: Able to stand from a seated position. Normal non-antalgic, non-myelopathic gait.  Able to heel/toe walk without loss of balance    Lumbar examination reveals tenderness of the spine and paraspinous muscles.  Hip height is symmetrical. Negative SI " joint, sciatic notch or greater trochanteric tenderness to palpation bilaterally.  Straight leg raise is negative bilaterally.      Imaging:   XR Lumbar 5/19/2022  FINDINGS: There are five lumbar type vertebral bodies. Mild anterior spondylosis at L4-L5. Otherwise normal alignment. Diffuse osteopenia. Mild superior endplate compression fracture at L4, age indeterminate. Otherwise no definite compression fractures. Mild disc space narrowing with mild to moderate marginal osteophytes. Mild to moderate facet arthropathy in the mid and lower lumbar spine. The sacrum and visualized iliac wings are unremarkable. Left hip arthroplasty partially visualized. Atherosclerotic calcification within the abdominal aorta with probable abdominal aortic aneurysm.    Assessment/Plan:   Lumbar compression fracture. Lumbar radiculopathy.     Reviewed lumbar XR. Await lumbar CT results. Due to radicular pain given lumbar fracture, would recommend lumbar MRI at this time. Will get him fit for a lumbar brace as well. Our office will contact him with the information about having the lumbar MRI completed at [facility]. He verbalized understanding and agreement.    Patient Instructions   -Lumbar MRI ordered. We will contact you with the details on how to schedule. Please contact our office when the MRI is complete and we will retrieve those records.   -Orthotics referral for lumbar brace. They will contact you to schedule.  -Please contact our clinic with questions or concerns at [phone].      Melissa Dunlap [facility] Neurosurgery  [address] [place] [id number]  Tel [phone]  Fax [phone]

## 2022-05-31 NOTE — NURSING NOTE
"Kenton Townsend is a 81 year old male who presents for:  Chief Complaint   Patient presents with     Neurologic Problem     LBP. Xray 5/19/22. Patient took a fall on 5/25/22. Patient was moving a scooter down some steps and both he and the scooter went backwards and landed on concrete steps. Pain is across the low back and will radiate down the bilateral leg, anterior. He does have some N/T in the legs. He is walking with a cane. He was on a muscle relaxer but that ran out. He takes hydrocodone 5/325 mg. He did fall again last Friday. His legs just weakened out and he went backwards again. MRI at [facility].        Vitals:    Vitals:    05/31/22 1029   BP: (!) 162/80   Pulse: 75   SpO2: 98%   Weight: 176 lb (79.8 kg)   Height: 5' 7\" (1.702 m)       BMI:  Estimated body mass index is 27.57 kg/m  as calculated from the following:    Height as of this encounter: 5' 7\" (1.702 m).    Weight as of this encounter: 176 lb (79.8 kg).    Pain Score:  Severe Pain (7)        Britney Kent CMA      "

## 2022-05-31 NOTE — PATIENT INSTRUCTIONS
-Lumbar MRI ordered. We will contact you with the details on how to schedule. Please contact our office when the MRI is complete and we will retrieve those records.   -Orthotics referral for lumbar brace. They will contact you to schedule.  -Please contact our clinic with questions or concerns at [phone].

## 2022-05-31 NOTE — TELEPHONE ENCOUNTER
Lula is calling to see if more than 10 pills of cyclobenzaprine can be sent to the pharmacy for the patient. This will only last him for 3 days.Gertrude Hardy MA/TC

## 2022-05-31 NOTE — LETTER
5/31/2022         RE: Kenton Townsend  [address]  [place] [address]        Dear Colleague,    Thank you for referring your patient, Kenton Townsend, [facility]. Please see a copy of my visit note below.    [facility] Neurosurgery  Neurosurgery Clinic Visit      CC: back and leg pain    Primary care Provider: Geovanni Vincent    Reason For Visit:   I was asked by Dr. Edwar Coelho to consult on the patient for acute mildine low back pain without sciatica.    HPI: Kenton Townsend is a 81 year old male who had a fall 1 month ago where his scooter landed on top of him. He has been seen in urgent care, primary care, as well as at the ED for this. He presents today for evaluation. He reports another mechanical fall recently as well. He describes low back pain that radiates to the bilateral anterior thighs. He denies paresthesias and overt weakness, however he does state his legs feel weak at times. He has a OTC brace that he has intermittently been wearing. He has had a lumbar XR and CT scan. We do not have access to the CT scan at this time. He has not had an MRI.     Pain right now:  7-8    Past Medical History:   Diagnosis Date     Glaucoma      Hypertension      rheumatoid arthritis        Past Medical History reviewed with patient during visit.    Past Surgical History:   Procedure Laterality Date     CATARACT IOL, RT/LT  2009    Cataract IOL RT/LT     JOINT REPLACEMENT, HIP RT/LT Left > 20 years ago    Joint Replacement Hip RT/LT     SURGICAL HISTORY OF -   13 years ago    humerus left     Past Surgical History reviewed with patient during visit.    Current Outpatient Medications   Medication     amLODIPine (NORVASC) 10 MG tablet     aspirin (ASA) 81 MG chewable tablet     atorvastatin (LIPITOR) 40 MG tablet     brimonidine (ALPHAGAN) 0.2 % ophthalmic solution     brimonidine-timolol (COMBIGAN) 0.2-0.5 % ophthalmic solution      "Cholecalciferol (VITAMIN D-3) 25 MCG (1000 UT) CAPS     hydrALAZINE (APRESOLINE) 25 MG tablet     lisinopril (ZESTRIL) 5 MG tablet     predniSONE (DELTASONE) 5 MG tablet     PRESERVISION AREDS OR     sildenafil (REVATIO) 20 MG tablet     TRAMADOL HCL 50 MG OR TABS     cyclobenzaprine (FLEXERIL) 5 MG tablet     No current facility-administered medications for this visit.       Allergies   Allergen Reactions     Nkda [No Known Drug Allergies]        Social History     Socioeconomic History     Marital status:    Tobacco Use     Smoking status: Former Smoker     Types: Cigarettes     Quit date: 1979     Years since quittin.5     Smokeless tobacco: Never Used   Vaping Use     Vaping Use: Never used   Substance and Sexual Activity     Alcohol use: Yes     Comment: couple times a week     Drug use: No     Sexual activity: Yes     Partners: Female       Family History   Problem Relation Age of Onset     Cancer Father         unknown     Cancer Brother         Hodskins      Eye Disorder Brother      Neurologic Disorder Son         vertigo         ROS: 10 point ROS neg other than the symptoms noted above in the HPI.    Vital Signs:   BP (!) 162/80   Pulse 75   Ht 5' 7\" (1.702 m)   Wt 176 lb (79.8 kg)   SpO2 98%   BMI 27.57 kg/m        Examination:  Constitutional:  Alert, well nourished, NAD.  Memory: recent and remote memory   HEENT: Normocephalic, atraumatic.   Pulm:  Without shortness of breath   CV:  No pitting edema of BLE.      Neurological:  Awake  Alert  Oriented x 3  Speech clear    Motor exam:   Hip Flexion:                 Right: 5/5  Left:  5/5  Hip Abduction:             Right:  5/5  Left:  5/5  Hip Adduction:             Right:  5/5  Left:  5/5  Plantar Flexion:           Right:  5/5  Left:  5/5  Dorsal Flexion:            Right:  5/5  Left:  5/5  EHL:                            Right:  5/5  Left:  5/5     Sensation normal to bilateral upper and lower extremities  Muscle tone to bilateral " upper and lower extremities   Gait: Able to stand from a seated position. Normal non-antalgic, non-myelopathic gait.  Able to heel/toe walk without loss of balance    Lumbar examination reveals tenderness of the spine and paraspinous muscles.  Hip height is symmetrical. Negative SI joint, sciatic notch or greater trochanteric tenderness to palpation bilaterally.  Straight leg raise is negative bilaterally.      Imaging:   XR Lumbar 5/19/2022  FINDINGS: There are five lumbar type vertebral bodies. Mild anterior spondylosis at L4-L5. Otherwise normal alignment. Diffuse osteopenia. Mild superior endplate compression fracture at L4, age indeterminate. Otherwise no definite compression fractures. Mild disc space narrowing with mild to moderate marginal osteophytes. Mild to moderate facet arthropathy in the mid and lower lumbar spine. The sacrum and visualized iliac wings are unremarkable. Left hip arthroplasty partially visualized. Atherosclerotic calcification within the abdominal aorta with probable abdominal aortic aneurysm.    Assessment/Plan:   Lumbar compression fracture. Lumbar radiculopathy.     Reviewed lumbar XR. Await lumbar CT results. Due to radicular pain given lumbar fracture, would recommend lumbar MRI at this time. Will get him fit for a lumbar brace as well. Our office will contact him with the information about having the lumbar MRI completed at [facility]. He verbalized understanding and agreement.    Patient Instructions   -Lumbar MRI ordered. We will contact you with the details on how to schedule. Please contact our office when the MRI is complete and we will retrieve those records.   -Orthotics referral for lumbar brace. They will contact you to schedule.  -Please contact our clinic with questions or concerns at [phone].      Melissa Dunlap, [facility] Neurosurgery  [address] [place] [id number]  Tel [phone]  Fax [phone]        Again, thank you  for allowing me to participate in the care of your patient.        Sincerely,        Melissa Dunlap NP

## 2022-05-31 NOTE — TELEPHONE ENCOUNTER
Routing refill request to provider for review/approval because:  Drug not on the [facility] refill protocol     Melissa Barahona RN

## 2022-06-01 DIAGNOSIS — M54.50 ACUTE MIDLINE LOW BACK PAIN WITHOUT SCIATICA: ICD-10-CM

## 2022-06-01 RX ORDER — CYCLOBENZAPRINE HCL 5 MG
TABLET ORAL
Qty: 10 TABLET | Refills: 0 | Status: SHIPPED | OUTPATIENT
Start: 2022-06-01 | End: 2022-06-20

## 2022-06-05 RX ORDER — CYCLOBENZAPRINE HCL 5 MG
5 TABLET ORAL AT BEDTIME
Qty: 10 TABLET | Refills: 0 | OUTPATIENT
Start: 2022-06-05

## 2022-06-06 ENCOUNTER — TELEPHONE (OUTPATIENT)
Dept: FAMILY MEDICINE | Facility: CLINIC | Age: 82
End: 2022-06-06
Payer: COMMERCIAL

## 2022-06-06 DIAGNOSIS — I10 HYPERTENSION GOAL BP (BLOOD PRESSURE) < 140/90: ICD-10-CM

## 2022-06-06 DIAGNOSIS — I10 ESSENTIAL HYPERTENSION WITH GOAL BLOOD PRESSURE LESS THAN 140/90: ICD-10-CM

## 2022-06-07 ENCOUNTER — TELEPHONE (OUTPATIENT)
Dept: NEUROSURGERY | Facility: CLINIC | Age: 82
End: 2022-06-07

## 2022-06-07 DIAGNOSIS — S32.000A LUMBAR COMPRESSION FRACTURE (H): ICD-10-CM

## 2022-06-07 DIAGNOSIS — S32.040A COMPRESSION FRACTURE OF L4 VERTEBRA, INITIAL ENCOUNTER (H): Primary | ICD-10-CM

## 2022-06-07 NOTE — TELEPHONE ENCOUNTER
Contacted patient and son to discuss lumbar MRI results. Reviewed options including continued brace and follow up in 6 weeks with a lumbar XR prior vs referral for possible vertebroplasty. They would like to proceed with the referral for vertebroplasty at this time.

## 2022-06-07 NOTE — TELEPHONE ENCOUNTER
"Routing refill request to provider for review/approval because:  Requested Prescriptions   Pending Prescriptions Disp Refills    lisinopril-hydrochlorothiazide (ZESTORETIC) 10-12.5 MG tablet [Pharmacy Med Name: LISINOPRIL-HCTZ 10-12.5 MG TAB] 90 tablet 0     Sig: TAKE 1 TABLET BY MOUTH EVERY DAY        Diuretics (Including Combos) Protocol Failed - 6/6/2022  7:07 PM        Failed - Blood pressure under 140/90 in past 12 months       BP Readings from Last 3 Encounters:   05/31/22 (!) 162/80   05/27/22 (!) 194/90 05/19/22 (!) 180/80                 Failed - Medication is active on med list        Failed - Normal serum creatinine on file in past 12 months       Recent Labs   Lab Test 04/11/22  1242   CR 1.56*              Failed - Normal serum sodium on file in past 12 months       Recent Labs   Lab Test 04/11/22  1242   *              Passed - Recent (12 mo) or future (30 days) visit within the authorizing provider's specialty     Patient has had an office visit with the authorizing provider or a provider within the authorizing providers department within the previous 12 mos or has a future within next 30 days. See \"Patient Info\" tab in inbasket, or \"Choose Columns\" in Meds & Orders section of the refill encounter.              Passed - Patient is age 18 or older        Passed - Normal serum potassium on file in past 12 months       Recent Labs   Lab Test 04/11/22  1242   POTASSIUM 4.6                   ACE Inhibitors (Including Combos) Protocol Failed - 6/6/2022  7:07 PM        Failed - Blood pressure under 140/90 in past 12 months       BP Readings from Last 3 Encounters:   05/31/22 (!) 162/80   05/27/22 (!) 194/90 05/19/22 (!) 180/80                 Failed - Medication is active on med list        Failed - Normal serum creatinine on file in past 12 months     Recent Labs   Lab Test 04/11/22  1242   CR 1.56*       Ok to refill medication if creatinine is low          Passed - Recent (12 mo) or future (30 " "days) visit within the authorizing provider's specialty     Patient has had an office visit with the authorizing provider or a provider within the authorizing providers department within the previous 12 mos or has a future within next 30 days. See \"Patient Info\" tab in inbasket, or \"Choose Columns\" in Meds & Orders section of the refill encounter.              Passed - Patient is age 18 or older        Passed - Normal serum potassium on file in past 12 months     Recent Labs   Lab Test 04/11/22  1242   POTASSIUM 4.6                       Eula SORENSON, RN        "

## 2022-06-08 NOTE — TELEPHONE ENCOUNTER
I don't believe I have ever seen this patient.    Please forward to a more appropriate provider.    Aguila Bey M.D.

## 2022-06-08 NOTE — TELEPHONE ENCOUNTER
Attempted to order Vertebroplasty but procedure not approved by insurance based on diagnosis. His insurance, at least per the system, will only approve it if fracture is pathological related to osteoporosis. No documented osteoporosis in chart. Recommended patient clarify with insurance and get back to us(via [facility]).    Number to Orthotics sent.

## 2022-06-13 ENCOUNTER — TELEPHONE (OUTPATIENT)
Dept: NEUROSURGERY | Facility: OTHER | Age: 82
End: 2022-06-13
Payer: COMMERCIAL

## 2022-06-13 DIAGNOSIS — S32.000A LUMBAR COMPRESSION FRACTURE (H): ICD-10-CM

## 2022-06-13 DIAGNOSIS — S32.040A COMPRESSION FRACTURE OF L4 VERTEBRA, INITIAL ENCOUNTER (H): Primary | ICD-10-CM

## 2022-06-13 NOTE — TELEPHONE ENCOUNTER
Per Melissa Dunlap, NP, ok to order DEXA.     Order placed for DEXA.    Update sent to patient via [employer].

## 2022-06-13 NOTE — TELEPHONE ENCOUNTER
Attempted to reach out to patient, no answer. Left voice message for patient to call clinic back to further discuss.     Routed to provider for review and recommendations for next steps since vertebroplasty was denied.

## 2022-06-13 NOTE — TELEPHONE ENCOUNTER
Patients son called to say they tried to schedule procedure, but insurance has denied.  Family wants other options.

## 2022-06-15 RX ORDER — LISINOPRIL/HYDROCHLOROTHIAZIDE 10-12.5 MG
TABLET ORAL
Qty: 90 TABLET | Refills: 0 | OUTPATIENT
Start: 2022-06-15

## 2022-06-16 NOTE — TELEPHONE ENCOUNTER
Tried calling patient, no voicemail set up.    Dr Vincent, the refill is for lisinopril-hydrochlorothiazide, but it looks like just lisinopril was filled on 4/14/22. Is he to be on both?Gertrude Hardy MA/TC

## 2022-06-17 NOTE — TELEPHONE ENCOUNTER
I wasn't aware patient was on both lisinopril AND lisinopril/hctz.   Patient should just be on only lisinopril/hctz and NOT both

## 2022-06-17 NOTE — TELEPHONE ENCOUNTER
Patient can't tell us what he is taking as his son sets up his medication and he has very low vision and can't read the bottle.  We will call them back on Monday to gather this information.      Son gets home around 3:30-4:00 pm each day from work    FYI from the patient Fractured his back 1-2 and 4-5

## 2022-06-17 NOTE — TELEPHONE ENCOUNTER
The caller you are trying to call has a Vm that has nto been set up yet.      RN:  Whomever speaks with Kenton please clarify what he is taking at this time.  Is he taking lisinopril/hctz or lisinopril.  If he is not taking the lisinopril/hctz only please relay Dr. Vincent's message below.  Please send back to Dr. Vincent to send a Prescription(s) for lisinopril/hctz to his pharmacy as it is not on the medication list.  Please remove the lisinopril only.  Thank you. Gloria Butler R.N.

## 2022-06-20 ENCOUNTER — ANCILLARY PROCEDURE (OUTPATIENT)
Dept: BONE DENSITY | Facility: CLINIC | Age: 82
End: 2022-06-20
Attending: NURSE PRACTITIONER
Payer: COMMERCIAL

## 2022-06-20 DIAGNOSIS — S32.000A LUMBAR COMPRESSION FRACTURE (H): ICD-10-CM

## 2022-06-20 DIAGNOSIS — S32.040A COMPRESSION FRACTURE OF L4 VERTEBRA, INITIAL ENCOUNTER (H): ICD-10-CM

## 2022-06-20 PROCEDURE — 77080 DXA BONE DENSITY AXIAL: CPT | Performed by: INTERNAL MEDICINE

## 2022-06-20 RX ORDER — NETARSUDIL AND LATANOPROST OPHTHALMIC SOLUTION, 0.02%/0.005% .2; .05 MG/ML; MG/ML
SOLUTION/ DROPS OPHTHALMIC; TOPICAL
COMMUNITY
Start: 2022-06-20

## 2022-06-20 RX ORDER — CHOLECALCIFEROL (VITAMIN D3) 50 MCG
1 TABLET ORAL DAILY
Status: CANCELLED | COMMUNITY
Start: 2022-06-20

## 2022-06-20 NOTE — TELEPHONE ENCOUNTER
Son is not available.  Please call back around 3:30 pm to gather this information.  Thank you. Gloria Butler R.N.

## 2022-06-20 NOTE — TELEPHONE ENCOUNTER
Provider: Please see below.  His son reports that he has not been on hydrochlorothiazide in the last year since he has been managing his mediations.  Please advise. Would you like him to take lisinopril/HCTZ or just lisinopril  Please sign the historical medication dosing if you agree with it.  Thank you. Gloria Butler R.N.    Son (Víctor) reports that Kenton is ONLY taking lisinopril 5 mg tablets (ZESTRIL).  Víctor has been doing his medication for about 1 year and has never given him hydrochlorothiazide in that time. I did a full medication rec and there are some changes reported below.     He was given topical Rocklatan 0.02% 0.005%  81 mg twice a day   Vitamin d 2000 units daily

## 2022-06-21 RX ORDER — LISINOPRIL 5 MG/1
5 TABLET ORAL DAILY
Qty: 90 TABLET | Refills: 1 | COMMUNITY
Start: 2022-06-21 | End: 2022-10-10

## 2022-06-21 NOTE — TELEPHONE ENCOUNTER
Patient notified of provider's message as written below. The patient was warm transferred to central scheduling and they were asked to assist patient in making an appointment in October at the patient's preferred location. They verified they understood. Patient verbalized good understanding, had no further questions and needed no further support.Gloria Butler R.N.

## 2022-06-21 NOTE — TELEPHONE ENCOUNTER
Ok for just lisinopril. Will need repeat lab/ blood pressure check in mid-fall. They should self-monitor blood pressure. Geovanni Vincent MD

## 2022-06-23 ENCOUNTER — MYC MEDICAL ADVICE (OUTPATIENT)
Dept: NEUROSURGERY | Facility: CLINIC | Age: 82
End: 2022-06-23

## 2022-06-23 DIAGNOSIS — M80.08XA AGE-RELATED OSTEOPOROSIS WITH CURRENT PATHOLOGICAL FRACTURE, VERTEBRA(E), INITIAL ENCOUNTER FOR FRACTURE (H): ICD-10-CM

## 2022-06-23 DIAGNOSIS — S32.040A COMPRESSION FRACTURE OF L4 VERTEBRA, INITIAL ENCOUNTER (H): Primary | ICD-10-CM

## 2022-06-23 DIAGNOSIS — M81.0 OSTEOPOROSIS: ICD-10-CM

## 2022-06-23 NOTE — TELEPHONE ENCOUNTER
Melissa Dunlap, NP updated RNs that DEXA came back with severe osteoprosis. We need to get patient in for vertebroplasty as previously recommended. Patient should also follow up with his PCP to discuss treatment options for osteoporosis -    Vert procedure ordered. Patient updated.     Faxed coversheet of referral June 23, 2022 to fax number [phone]    Right Fax confirmed at [phone]    Yvonne Salguero RN     Additional osteoporosis education (clinical resources) sent to patient via [employer]

## 2022-06-28 ENCOUNTER — TELEPHONE (OUTPATIENT)
Dept: FAMILY MEDICINE | Facility: CLINIC | Age: 82
End: 2022-06-28

## 2022-06-28 NOTE — TELEPHONE ENCOUNTER
Ok. I'd prefer patient seen sooner by another provider in case there is abnormal labs/etc that might delay surgery. If nobody else can get into see I will see. Geovanni Vincent MD

## 2022-06-28 NOTE — TELEPHONE ENCOUNTER
Pt needs a Pre-op appt. DOS is 7/6/22. Can you fit patient in on 7/5/22 in TERRY slot? Procedure is called, Verdaplasti. Don't have correct spelling.    Please advise  Ok to schedule and leave detailed message.  Carri Franco

## 2022-07-01 ENCOUNTER — OFFICE VISIT (OUTPATIENT)
Dept: FAMILY MEDICINE | Facility: CLINIC | Age: 82
End: 2022-07-01
Payer: COMMERCIAL

## 2022-07-01 VITALS
TEMPERATURE: 97.2 F | WEIGHT: 177 LBS | OXYGEN SATURATION: 97 % | DIASTOLIC BLOOD PRESSURE: 82 MMHG | SYSTOLIC BLOOD PRESSURE: 148 MMHG | HEART RATE: 68 BPM | BODY MASS INDEX: 27.78 KG/M2 | HEIGHT: 67 IN

## 2022-07-01 DIAGNOSIS — M80.00XD AGE-RELATED OSTEOPOROSIS WITH CURRENT PATHOLOGICAL FRACTURE WITH ROUTINE HEALING: Chronic | ICD-10-CM

## 2022-07-01 DIAGNOSIS — N18.32 STAGE 3B CHRONIC KIDNEY DISEASE (H): ICD-10-CM

## 2022-07-01 DIAGNOSIS — S32.040D COMPRESSION FRACTURE OF L4 VERTEBRA WITH ROUTINE HEALING, SUBSEQUENT ENCOUNTER: ICD-10-CM

## 2022-07-01 DIAGNOSIS — I10 HYPERTENSION GOAL BP (BLOOD PRESSURE) < 140/90: ICD-10-CM

## 2022-07-01 DIAGNOSIS — I10 ESSENTIAL HYPERTENSION WITH GOAL BLOOD PRESSURE LESS THAN 140/90: ICD-10-CM

## 2022-07-01 DIAGNOSIS — Z01.818 PREOP GENERAL PHYSICAL EXAM: Primary | ICD-10-CM

## 2022-07-01 PROBLEM — I16.1 HYPERTENSIVE EMERGENCY: Status: ACTIVE | Noted: 2022-02-04

## 2022-07-01 PROBLEM — I63.9 ACUTE CVA (CEREBROVASCULAR ACCIDENT) (H): Status: ACTIVE | Noted: 2022-02-04

## 2022-07-01 PROBLEM — I65.23 CAROTID STENOSIS, ASYMPTOMATIC, BILATERAL: Status: ACTIVE | Noted: 2022-02-05

## 2022-07-01 LAB
CREAT SERPL-MCNC: 1.74 MG/DL (ref 0.66–1.25)
GFR SERPL CREATININE-BSD FRML MDRD: 38 ML/MIN/1.73M2
HGB BLD-MCNC: 13.7 G/DL (ref 13.3–17.7)
POTASSIUM BLD-SCNC: 4.6 MMOL/L (ref 3.4–5.3)

## 2022-07-01 PROCEDURE — 36415 COLL VENOUS BLD VENIPUNCTURE: CPT | Performed by: NURSE PRACTITIONER

## 2022-07-01 PROCEDURE — 99214 OFFICE O/P EST MOD 30 MIN: CPT | Performed by: NURSE PRACTITIONER

## 2022-07-01 PROCEDURE — 84132 ASSAY OF SERUM POTASSIUM: CPT | Performed by: NURSE PRACTITIONER

## 2022-07-01 PROCEDURE — 82565 ASSAY OF CREATININE: CPT | Performed by: NURSE PRACTITIONER

## 2022-07-01 PROCEDURE — 85018 HEMOGLOBIN: CPT | Performed by: NURSE PRACTITIONER

## 2022-07-01 RX ORDER — HYDRALAZINE HYDROCHLORIDE 25 MG/1
25 TABLET, FILM COATED ORAL 3 TIMES DAILY
Qty: 270 TABLET | Refills: 0 | Status: SHIPPED | OUTPATIENT
Start: 2022-07-01

## 2022-07-01 RX ORDER — ASPIRIN 81 MG/1
81 TABLET ORAL DAILY
COMMUNITY
End: 2024-01-25

## 2022-07-01 NOTE — PATIENT INSTRUCTIONS
I will be in touch with lab results.   Increase the hydralazine to 25 mg three times per day, to help blood pressure.    Hold your aspirin until after your procedure.     No other changes.         Preparing for Your Surgery  Getting started  A nurse will call you to review your health history and instructions. They will give you an arrival time based on your scheduled surgery time. Please be ready to share:  Your doctor's clinic name and phone number  Your medical, surgical and anesthesia history  A list of allergies and sensitivities  A list of medicines, including herbal treatments and over-the-counter drugs  Whether the patient has a legal guardian (ask how to send us the papers in advance)  Please tell us if you're pregnant--or if there's any chance you might be pregnant. Some surgeries may injure a fetus (unborn baby), so they require a pregnancy test. Surgeries that are safe for a fetus don't always need a test, and you can choose whether to have one.   If you have a child who's having surgery, please ask for a copy of Preparing for Your Child's Surgery.    Preparing for surgery  Within 30 days of surgery: Have a pre-op exam (sometimes called an H&P, or History and Physical). This can be done at a clinic or pre-operative center.  If you're having a , you may not need this exam. Talk to your care team.  At your pre-op exam, talk to your care team about all medicines you take. If you need to stop any medicines before surgery, ask when to start taking them again.  We do this for your safety. Many medicines can make you bleed too much during surgery. Some change how well surgery (anesthesia) drugs work.  Call your insurance company to let them know you're having surgery. (If you don't have insurance, call [phone].)  Call your clinic if there's any change in your health. This includes signs of a cold or flu (sore throat, runny nose, cough, rash, fever). It also includes a scrape or scratch near the  surgery site.  If you have questions on the day of surgery, call your hospital or surgery center.  COVID testing  You may need to be tested for COVID-19 before having surgery. If so, we will give you instructions.  Eating and drinking guidelines  For your safety: Unless your surgeon tells you otherwise, follow the guidelines below.  Eat and drink as usual until 8 hours before surgery. After that, no food or milk.  Drink clear liquids until 2 hours before surgery. These are liquids you can see through, like water, Gatorade and Propel Water. You may also have black coffee and tea (no cream or milk).  Nothing by mouth within 2 hours of surgery. This includes gum, candy and breath mints.  If you drink alcohol: Stop drinking it the night before surgery.  If your care team tells you to take medicine on the morning of surgery, it's okay to take it with a sip of water.  Preventing infection  Shower or bathe the night before and morning of your surgery. Follow the instructions your clinic gave you. (If no instructions, use regular soap.)  Don't shave or clip hair near your surgery site. We'll remove the hair if needed.  Don't smoke or vape the morning of surgery. You may chew nicotine gum up to 2 hours before surgery. A nicotine patch is okay.  Note: Some surgeries require you to completely quit smoking and nicotine. Check with your surgeon.  Your care team will make every effort to keep you safe from infection. We will:  Clean our hands often with soap and water (or an alcohol-based hand rub).  Clean the skin at your surgery site with a special soap that kills germs.  Give you a special gown to keep you warm. (Cold raises the risk of infection.)  Wear special hair covers, masks, gowns and gloves during surgery.  Give antibiotic medicine, if prescribed. Not all surgeries need antibiotics.  What to bring on the day of surgery  Photo ID and insurance card  Copy of your health care directive, if you have one  Glasses and  hearing aides (bring cases)  You can't wear contacts during surgery  Inhaler and eye drops, if you use them (tell us about these when you arrive)  CPAP machine or breathing device, if you use them  A few personal items, if spending the night  If you have . . .  A pacemaker, ICD (cardiac defibrillator) or other implant: Bring the ID card.  An implanted stimulator: Bring the remote control.  A legal guardian: Bring a copy of the certified (court-stamped) guardianship papers.  Please remove any jewelry, including body piercings. Leave jewelry and other valuables at home.  If you're going home the day of surgery  You must have a responsible adult drive you home. They should stay with you overnight as well.  If you don't have someone to stay with you, and you aren't safe to go home alone, we may keep you overnight. Insurance often won't pay for this.  After surgery  If it's hard to control your pain or you need more pain medicine, please call your surgeon's office.  Questions?   If you have any questions for your care team, list them here: _________________________________________________________________________________________________________________________________________________________________________ ____________________________________ ____________________________________ ____________________________________  For informational purposes only. Not to replace the advice of your health care provider. Copyright   2003, 2019 [email or web address]. All rights reserved. Clinically reviewed by Gemini Tidwell MD. [employer] [id number] - REV 07/21.    Preparing for Your Surgery  Getting started  A nurse will call you to review your health history and instructions. They will give you an arrival time based on your scheduled surgery time. Please be ready to share:  Your doctor's clinic name and phone number  Your medical, surgical and anesthesia history  A list of allergies and sensitivities  A list of medicines, including herbal  treatments and over-the-counter drugs  Whether the patient has a legal guardian (ask how to send us the papers in advance)  Please tell us if you're pregnant--or if there's any chance you might be pregnant. Some surgeries may injure a fetus (unborn baby), so they require a pregnancy test. Surgeries that are safe for a fetus don't always need a test, and you can choose whether to have one.   If you have a child who's having surgery, please ask for a copy of Preparing for Your Child's Surgery.    Preparing for surgery  Within 30 days of surgery: Have a pre-op exam (sometimes called an H&P, or History and Physical). This can be done at a clinic or pre-operative center.  If you're having a , you may not need this exam. Talk to your care team.  At your pre-op exam, talk to your care team about all medicines you take. If you need to stop any medicines before surgery, ask when to start taking them again.  We do this for your safety. Many medicines can make you bleed too much during surgery. Some change how well surgery (anesthesia) drugs work.  Call your insurance company to let them know you're having surgery. (If you don't have insurance, call [phone].)  Call your clinic if there's any change in your health. This includes signs of a cold or flu (sore throat, runny nose, cough, rash, fever). It also includes a scrape or scratch near the surgery site.  If you have questions on the day of surgery, call your hospital or surgery center.  COVID testing  You may need to be tested for COVID-19 before having surgery. If so, we will give you instructions.  Eating and drinking guidelines  For your safety: Unless your surgeon tells you otherwise, follow the guidelines below.  Eat and drink as usual until 8 hours before surgery. After that, no food or milk.  Drink clear liquids until 2 hours before surgery. These are liquids you can see through, like water, Gatorade and Propel Water. You may also have black coffee and  tea (no cream or milk).  Nothing by mouth within 2 hours of surgery. This includes gum, candy and breath mints.  If you drink alcohol: Stop drinking it the night before surgery.  If your care team tells you to take medicine on the morning of surgery, it's okay to take it with a sip of water.  Preventing infection  Shower or bathe the night before and morning of your surgery. Follow the instructions your clinic gave you. (If no instructions, use regular soap.)  Don't shave or clip hair near your surgery site. We'll remove the hair if needed.  Don't smoke or vape the morning of surgery. You may chew nicotine gum up to 2 hours before surgery. A nicotine patch is okay.  Note: Some surgeries require you to completely quit smoking and nicotine. Check with your surgeon.  Your care team will make every effort to keep you safe from infection. We will:  Clean our hands often with soap and water (or an alcohol-based hand rub).  Clean the skin at your surgery site with a special soap that kills germs.  Give you a special gown to keep you warm. (Cold raises the risk of infection.)  Wear special hair covers, masks, gowns and gloves during surgery.  Give antibiotic medicine, if prescribed. Not all surgeries need antibiotics.  What to bring on the day of surgery  Photo ID and insurance card  Copy of your health care directive, if you have one  Glasses and hearing aides (bring cases)  You can't wear contacts during surgery  Inhaler and eye drops, if you use them (tell us about these when you arrive)  CPAP machine or breathing device, if you use them  A few personal items, if spending the night  If you have . . .  A pacemaker, ICD (cardiac defibrillator) or other implant: Bring the ID card.  An implanted stimulator: Bring the remote control.  A legal guardian: Bring a copy of the certified (court-stamped) guardianship papers.  Please remove any jewelry, including body piercings. Leave jewelry and other valuables at home.  If you're  going home the day of surgery  You must have a responsible adult drive you home. They should stay with you overnight as well.  If you don't have someone to stay with you, and you aren't safe to go home alone, we may keep you overnight. Insurance often won't pay for this.  After surgery  If it's hard to control your pain or you need more pain medicine, please call your surgeon's office.  Questions?   If you have any questions for your care team, list them here: _________________________________________________________________________________________________________________________________________________________________________ ____________________________________ ____________________________________ ____________________________________  For informational purposes only. Not to replace the advice of your health care provider. Copyright   2003, 2019 [facility]. All rights reserved. Clinically reviewed by Gemini Tidwell MD. [employer] [id number] - REV 07/21.

## 2022-07-01 NOTE — PROGRESS NOTES
[facility]  [address] [facility] [address]  Phone: [phone]  Primary Provider: Geovanni Vincent  Pre-op Performing Provider: MERCEDEZ PÉREZ      PREOPERATIVE EVALUATION:  Today's date: 7/1/2022    Kenton Townsend is a 82 year old male who presents for a preoperative evaluation.    Surgical Information:  Surgery/Procedure: lumbar vertebroplasty  Surgery Location: [facility]  Surgeon:   Surgery Date: 7/6/2022  Time of Surgery: TBD  Where patient plans to recover: At home with family  Fax number for surgical facility: [facility]    Type of Anesthesia Anticipated: to be determined    Assessment & Plan     The proposed surgical procedure is considered INTERMEDIATE risk.    Preop general physical exam  - Hemoglobin  - Creatinine  - Potassium    Compression fracture of L4 vertebra with routine healing, subsequent encounter    Age-related osteoporosis with current pathological fracture with routine healing    Stage 3b chronic kidney disease (H)  Chronic, stable.     Essential hypertension with goal blood pressure less than 140/90  Chronic, not at goal.  Increase hydralazine from BID to TID, continue other medications.  Okay for surgery.     - hydrALAZINE (APRESOLINE) 25 MG tablet; Take 1 tablet (25 mg) by mouth 3 times daily TAKE 1 TABLET BY MOUTH TWICE A DAY for blood pressure          Risks and Recommendations:  The patient has the following additional risks and recommendations for perioperative complications:   - No identified additional risk factors other than previously addressed    Medication Instructions:  Patient is to take all scheduled medications on the day of surgery EXCEPT for modifications listed below:  Hold ASA until after procedure.       RECOMMENDATION:  APPROVAL GIVEN to proceed with proposed procedure, without further diagnostic evaluation.    Subjective     HPI related to upcoming procedure: Kenton Townsend is a 83 yo male who presents for a preoperative  physical.  Kenton will be having a lumbar vertebroplasty for treatment of a lumbar compression fracture, due fall and osteoporosis.         Preop Questions 7/1/2022   1. Have you ever had a heart attack or stroke? YES - CVA February of 2022   2. Have you ever had surgery on your heart or blood vessels, such as a stent placement, a coronary artery bypass, or surgery on an artery in your head, neck, heart, or legs? No   3. Do you have chest pain with activity? No   4. Do you have a history of  heart failure? No   5. Do you currently have a cold, bronchitis or symptoms of other infection? No   6. Do you have a cough, shortness of breath, or wheezing? No   7. Do you or anyone in your family have previous history of blood clots? No   8. Do you or does anyone in your family have a serious bleeding problem such as prolonged bleeding following surgeries or cuts? No   9. Have you ever had problems with anemia or been told to take iron pills? No   10. Have you had any abnormal blood loss such as black, tarry or bloody stools? No   11. Have you ever had a blood transfusion? No   12. Are you willing to have a blood transfusion if it is medically needed before, during, or after your surgery? Yes   13. Have you or any of your relatives ever had problems with anesthesia? No   14. Do you have sleep apnea, excessive snoring or daytime drowsiness? No   15. Do you have any artifical heart valves or other implanted medical devices like a pacemaker, defibrillator, or continuous glucose monitor? No   16. Do you have artificial joints? YES - left hip.      17. Are you allergic to latex? No     Health Care Directive:  Patient does not have a Health Care Directive or Living Will:     Preoperative Review of :   reviewed - no record of controlled substances prescribed.      Status of Chronic Conditions:  See problem list for active medical problems.  Problems all longstanding and stable, except as noted/documented.  See ROS for pertinent  symptoms related to these conditions.      Review of Systems  CONSTITUTIONAL: NEGATIVE for fever, chills, change in weight  INTEGUMENTARY/SKIN: NEGATIVE for worrisome rashes, moles or lesions  EYES: NEGATIVE for vision changes or irritation  ENT/MOUTH: NEGATIVE for ear, mouth and throat problems  RESP: NEGATIVE for significant cough or SOB  CV: NEGATIVE for chest pain, palpitations or peripheral edema  GI: NEGATIVE for nausea, abdominal pain, heartburn, or change in bowel habits  : NEGATIVE for frequency, dysuria, or hematuria  MUSCULOSKELETAL: NEGATIVE for significant arthralgias or myalgia  NEURO: NEGATIVE for weakness, dizziness or paresthesias  ENDOCRINE: NEGATIVE for temperature intolerance, skin/hair changes  HEME: NEGATIVE for bleeding problems  PSYCHIATRIC: NEGATIVE for changes in mood or affect    Patient Active Problem List    Diagnosis Date Noted     Age-related osteoporosis with current pathological fracture with routine healing 07/01/2022     Priority: Medium     Carotid stenosis, asymptomatic, bilateral 02/05/2022     Priority: Medium     Acute CVA (cerebrovascular accident) (H) 02/04/2022     Priority: Medium     Hypertensive emergency 02/04/2022     Priority: Medium     CKD (chronic kidney disease) stage 3, GFR 30-59 ml/min (H) 10/15/2019     Priority: Medium     Rheumatoid arthritis with positive rheumatoid factor, involving unspecified site (H) 07/21/2016     Priority: Medium     Essential hypertension with goal blood pressure less than 140/90 06/27/2016     Priority: Medium     Anemia of chronic renal failure, stage 2 (mild) 12/07/2015     Priority: Medium     Chronic renal insufficiency, stage II (mild) 12/07/2015     Priority: Medium     Hypertension goal BP (blood pressure) < 140/90 06/06/2013     Priority: Medium     Obesity 07/12/2012     Priority: Medium     AMD (age related macular degeneration) 05/22/2012     Priority: Medium     Vision loss, left eye 05/18/2012     Priority: Medium      1st degree AV block 2012     Priority: Medium     Advanced directives, counseling/discussion 2012     Priority: Medium     Discussed advance care planning with patient; information given to patient to review. 2012          CARDIOVASCULAR SCREENING; LDL GOAL LESS THAN 160 10/31/2010     Priority: Medium     Glaucoma 2009     Priority: Medium      Past Medical History:   Diagnosis Date     Glaucoma      Hypertension      rheumatoid arthritis      Past Surgical History:   Procedure Laterality Date     CATARACT IOL, RT/LT      Cataract IOL RT/LT     JOINT REPLACEMENT, HIP RT/LT Left > 20 years ago    Joint Replacement Hip RT/LT     SURGICAL HISTORY OF -   13 years ago    humerus left     Current Outpatient Medications   Medication Sig Dispense Refill     amLODIPine (NORVASC) 10 MG tablet Take 1 tablet (10 mg) by mouth daily Take at bedtime for blood pressure 90 tablet 1     aspirin 81 MG EC tablet Take 81 mg by mouth daily       atorvastatin (LIPITOR) 40 MG tablet Take 1 tablet (40 mg) by mouth daily For cholesterol 90 tablet 3     brimonidine (ALPHAGAN) 0.2 % ophthalmic solution At Bedtime       brimonidine-timolol (COMBIGAN) 0.2-0.5 % ophthalmic solution 1 drop 2 times daily.       hydrALAZINE (APRESOLINE) 25 MG tablet Take 1 tablet (25 mg) by mouth 3 times daily TAKE 1 TABLET BY MOUTH TWICE A DAY for blood pressure 270 tablet 0     lisinopril (ZESTRIL) 5 MG tablet Take 1 tablet (5 mg) by mouth daily For blood pressure in AM 90 tablet 1     Netarsudil-Latanoprost (ROCKLATAN) 0.02-0.005 % SOLN Given by his eye provider       predniSONE (DELTASONE) 5 MG tablet Take 1 tablet (5 mg) by mouth daily For arthritis 90 tablet 1     PRESERVISION AREDS OR 1 tablet twice daily         Allergies   Allergen Reactions     Nkda [No Known Drug Allergies]         Social History     Tobacco Use     Smoking status: Former Smoker     Types: Cigarettes     Quit date: 1979     Years since quittin.6      "Smokeless tobacco: Never Used   Substance Use Topics     Alcohol use: Yes     Comment: couple times a week       History   Drug Use No         Objective     BP (!) 148/82   Pulse 68   Temp 97.2  F (36.2  C)   Ht 1.702 m (5' 7\")   Wt 80.3 kg (177 lb)   SpO2 97%   BMI 27.72 kg/m      Physical Exam    GENERAL APPEARANCE: healthy, alert and no distress     EYES: EOMI,  PERRL     HENT: nose and mouth without ulcers or lesions     NECK: no adenopathy, no asymmetry, masses, or scars and thyroid normal to palpation     RESP: lungs clear to auscultation - no rales, rhonchi or wheezes     CV: regular rates and rhythm, normal S1 S2, no S3 or S4 and no murmur, click or rub     ABDOMEN:  soft, nontender, no HSM or masses and bowel sounds normal     MS: extremities normal- no gross deformities noted, no evidence of inflammation in joints, FROM in all extremities.     SKIN: no suspicious lesions or rashes     NEURO: Normal strength and tone, sensory exam grossly normal, mentation intact and speech normal     PSYCH: mentation appears normal. and affect normal/bright     LYMPHATICS: No cervical adenopathy         Diagnostics:  Hemoglobin   Date Value Ref Range Status   07/01/2022 13.7 13.3 - 17.7 g/dL Final   02/05/2019 14.4 13.3 - 17.7 g/dL Final     Creatinine   Date Value Ref Range Status   07/01/2022 1.74 (H) 0.66 - 1.25 mg/dL Final   04/30/2021 1.63 (H) 0.66 - 1.25 mg/dL Final     Potassium   Date Value Ref Range Status   07/01/2022 4.6 3.4 - 5.3 mmol/L Final   04/30/2021 4.6 3.4 - 5.3 mmol/L Final        No EKG required, no history of coronary heart disease, significant arrhythmia, peripheral arterial disease or other structural heart disease.    Revised Cardiac Risk Index (RCRI):  The patient has the following serious cardiovascular risks for perioperative complications:   - Cerebrovascular Disease (TIA or CVA) = 1 point     RCRI Interpretation: 1 point: Class II (low risk - 0.9% complication rate)           Signed " Electronically by: Melissa Canchola CNP  Copy of this evaluation report is provided to requesting physician.

## 2022-09-02 ENCOUNTER — NURSE TRIAGE (OUTPATIENT)
Dept: NURSING | Facility: CLINIC | Age: 82
End: 2022-09-02

## 2022-09-03 NOTE — TELEPHONE ENCOUNTER
"Triage Call:    Caller: Patient     Patient took a home test for COVID and it came back positive today.  Symptoms started Wednesday.   Sx: Cough, \"Can't sleep\".      Advised should have a virtaul visit and does quality for treatment due to age.  Transferred call to scheduling.  He was able to get a visit scheduled for Sunday.      Carri Rodriguez RN on 9/2/2022 at 7:43 PM      Reason for Disposition    HIGH RISK for severe COVID complications (e.g., weak immune system, age > 64 years, obesity with BMI > 25, pregnant, chronic lung disease or other chronic medical condition)  (Exception: Already seen by PCP and no new or worsening symptoms.)    Additional Information    Negative: SEVERE difficulty breathing (e.g., struggling for each breath, speaks in single words)    Negative: Difficult to awaken or acting confused (e.g., disoriented, slurred speech)    Negative: Bluish (or gray) lips or face now    Negative: Shock suspected (e.g., cold/pale/clammy skin, too weak to stand, low BP, rapid pulse)    Negative: Sounds like a life-threatening emergency to the triager    Negative: [1] Diagnosed or suspected COVID-19 AND [2] symptoms lasting 3 or more weeks    Negative: [1] COVID-19 exposure AND [2] no symptoms    Negative: COVID-19 vaccine reaction suspected (e.g., fever, headache, muscle aches) occurring 1 to 3 days after getting vaccine    Negative: COVID-19 vaccine, questions about    Negative: [1] Lives with someone known to have influenza (flu test positive) AND [2] flu-like symptoms (e.g., cough, runny nose, sore throat, SOB; with or without fever)    Negative: [1] Adult with possible COVID-19 symptoms AND [2] triager concerned about severity of symptoms or other causes    Negative: COVID-19 and breastfeeding, questions about    Negative: SEVERE or constant chest pain or pressure  (Exception: Mild central chest pain, present only when coughing.)    Negative: MODERATE difficulty breathing (e.g., speaks in phrases, " SOB even at rest, pulse 100-120)    Negative: [1] Headache AND [2] stiff neck (can't touch chin to chest)    Negative: Oxygen level (e.g., pulse oximetry) 90 percent or lower    Negative: Chest pain or pressure    Negative: Patient sounds very sick or weak to the triager    Negative: MILD difficulty breathing (e.g., minimal/no SOB at rest, SOB with walking, pulse <100)    Negative: Fever > 103 F (39.4 C)    Negative: [1] Fever > 101 F (38.3 C) AND [2] age > 60 years    Negative: [1] Fever > 100.0 F (37.8 C) AND [2] bedridden (e.g., nursing home patient, CVA, chronic illness, recovering from surgery)    Protocols used: CORONAVIRUS (COVID-19) DIAGNOSED OR [id number] 1.18.2022

## 2022-09-04 ENCOUNTER — VIRTUAL VISIT (OUTPATIENT)
Dept: URGENT CARE | Facility: CLINIC | Age: 82
End: 2022-09-04
Payer: COMMERCIAL

## 2022-09-04 DIAGNOSIS — U07.1 INFECTION DUE TO 2019 NOVEL CORONAVIRUS: Primary | ICD-10-CM

## 2022-09-04 PROCEDURE — 99207 PR NO CHARGE LOS: CPT

## 2022-09-04 NOTE — PROGRESS NOTES
Kenton is a 82 year old who is being evaluated via a billable telephone visit.      Sx started on Wednesday.  Tested + on Friday.  COVID vaccinated and boosted.  Feels much better today- does not feel he needs an antiviral today.    What phone number would you like to be contacted at? cell  How would you like to obtain your AVS? MyChart    Assessment & Plan     Infection due to 2019 novel coronavirus    Patient qualifies for Paxlovid (this is day #5 for him from symptom onset) but defers RX feeling much better.  Lives with son.  Advised close Follow-up if any new or worsening sx prn.  No charge for [facility].    Lesley Justice MD  Virtual Urgent Care  [facility]    Subjective   Kenton is a 82 year old, presenting for the following health issues:  No chief complaint on file.      HPI   Sx started on Wednesday.  Tested + on Friday.  COVID vaccinated and boosted.  Feels much better today- does not feel he needs an antiviral today.        Review of Systems   Constitutional, HEENT, cardiovascular, pulmonary, GI, , musculoskeletal, neuro, skin, endocrine and psych systems are negative, except as otherwise noted.      Objective           Vitals:  No vitals were obtained today due to virtual visit.    Physical Exam   healthy, alert and no distress  PSYCH: Alert and oriented times 3; coherent speech, normal   rate and volume, able to articulate logical thoughts, able   to abstract reason, no tangential thoughts, no hallucinations   or delusions  His affect is normal and pleasant  RESP: No cough, no audible wheezing, able to talk in full sentences  Remainder of exam unable to be completed due to telephone visits                Phone call duration: 5 minutes

## 2022-09-26 ENCOUNTER — TRANSFERRED RECORDS (OUTPATIENT)
Dept: HEALTH INFORMATION MANAGEMENT | Facility: CLINIC | Age: 82
End: 2022-09-26

## 2022-09-27 ENCOUNTER — DOCUMENTATION ONLY (OUTPATIENT)
Dept: LAB | Facility: CLINIC | Age: 82
End: 2022-09-27

## 2022-09-28 NOTE — PROGRESS NOTES
Called and spoke to patient. I informed him of Dr Vincent's message, Lab appointment cancelled.Gertrude Hardy MA/GULSHAN

## 2022-09-28 NOTE — PROGRESS NOTES
Kenton CRYSTAL NavarroTownsend has an upcoming lab appointment:    Future Appointments   Date Time Provider Department [place]   10/3/2022  8:15 AM [facility]   10/10/2022 11:30 AM Geovanni Vincent MD [facility]     Patient is scheduled for the following lab(s): Pre-visit lab    There is no order available. Please review and place either future orders or HMPO (Review of Health Maintenance Protocol Orders), as appropriate.    Health Maintenance Due   Topic     ANNUAL REVIEW OF HM ORDERS      Mariel Montenegro

## 2022-10-07 NOTE — PROGRESS NOTES
ASSESSMENT / PLAN:  (I10) Hypertension goal BP (blood pressure) < 140/90  (primary encounter diagnosis)  Comment: needs help  Plan: amLODIPine (NORVASC) 10 MG tablet, lisinopril         (ZESTRIL) 5 MG tablet, hydrALAZINE (APRESOLINE)        50 MG tablet        Double hydralazine and continue self-monitor./low sodium. Recheck in 4 months  Sooner if worse. Chest pain to er.     (N18.2,  D63.1) Anemia of chronic renal failure, stage 2 (mild)    Plan: Recheck in 4 months  Consider nephrology if worse.     (I63.9) Acute CVA (cerebrovascular accident) (H)  Comment: stable  Plan: continue lower blood pressure and monitor. To ER if reoccurring symptoms. Continue statin.     (M06.9) Rheumatoid arthritis, involving unspecified site, unspecified whether rheumatoid factor present (H)  Comment: stable  Plan: continue prednisone. Rheum if worse.        Adeola Fung is a 82 year old presenting for the following health issues:  Follow-up cva, cri, anemia, RA, high cholesterol and htn.   On daily prednisone stable RA.   S/p back surgery - did well. Pain overall improving  No nsaids. tyelnol prn. Water in ok.   No chest pain or shortness of breath. No urine changes or hematuria. Emotionally doing ok. Sleep overall ok.  Son at home. Mild ankle swelling bilaterally end of day. Good exercisie tolerance - active.   Outside blood pressure borderline 140.  No kidney specialist. No ALCOHOL   Lipids and Hypertension (Home readings have been running high)      HPI   Review of Systems         Objective    Physical Exam BP (!) 148/71   Pulse 59   Temp 97.7  F (36.5  C) (Oral)   Wt 83.9 kg (185 lb)   SpO2 97%   BMI 28.98 kg/m      GENERAL: healthy, alert and no distress  NECK: no adenopathy, no asymmetry, masses, or scars and thyroid normal to palpation  RESP: lungs clear to auscultation - no rales, rhonchi or wheezes  CV: regular rate and rhythm, normal S1 S2, no S3 or S4, no murmur, click or rub, no peripheral edema and peripheral  pulses strong  ABDOMEN: soft, nontender, no hepatosplenomegaly, no masses and bowel sounds normal  MS: no gross musculoskeletal defects noted, no edema  PSYCH: mentation appears normal, affect normal/bright

## 2022-10-08 DIAGNOSIS — I10 HYPERTENSION GOAL BP (BLOOD PRESSURE) < 140/90: ICD-10-CM

## 2022-10-10 ENCOUNTER — OFFICE VISIT (OUTPATIENT)
Dept: FAMILY MEDICINE | Facility: CLINIC | Age: 82
End: 2022-10-10
Payer: COMMERCIAL

## 2022-10-10 VITALS
SYSTOLIC BLOOD PRESSURE: 148 MMHG | OXYGEN SATURATION: 97 % | HEART RATE: 59 BPM | TEMPERATURE: 97.7 F | DIASTOLIC BLOOD PRESSURE: 71 MMHG | BODY MASS INDEX: 28.98 KG/M2 | WEIGHT: 185 LBS

## 2022-10-10 DIAGNOSIS — M06.9 RHEUMATOID ARTHRITIS, INVOLVING UNSPECIFIED SITE, UNSPECIFIED WHETHER RHEUMATOID FACTOR PRESENT (H): ICD-10-CM

## 2022-10-10 DIAGNOSIS — I63.9 ACUTE CVA (CEREBROVASCULAR ACCIDENT) (H): ICD-10-CM

## 2022-10-10 DIAGNOSIS — I10 HYPERTENSION GOAL BP (BLOOD PRESSURE) < 140/90: Primary | ICD-10-CM

## 2022-10-10 DIAGNOSIS — D63.1 ANEMIA OF CHRONIC RENAL FAILURE, STAGE 2 (MILD): ICD-10-CM

## 2022-10-10 DIAGNOSIS — N18.2 ANEMIA OF CHRONIC RENAL FAILURE, STAGE 2 (MILD): ICD-10-CM

## 2022-10-10 PROCEDURE — 99214 OFFICE O/P EST MOD 30 MIN: CPT | Mod: 25 | Performed by: FAMILY MEDICINE

## 2022-10-10 PROCEDURE — 90662 IIV NO PRSV INCREASED AG IM: CPT | Performed by: FAMILY MEDICINE

## 2022-10-10 PROCEDURE — 91312 COVID-19,PF,PFIZER BOOSTER BIVALENT: CPT | Performed by: FAMILY MEDICINE

## 2022-10-10 PROCEDURE — G0008 ADMIN INFLUENZA VIRUS VAC: HCPCS | Performed by: FAMILY MEDICINE

## 2022-10-10 PROCEDURE — 0124A COVID-19,PF,PFIZER BOOSTER BIVALENT: CPT | Performed by: FAMILY MEDICINE

## 2022-10-10 RX ORDER — HYDRALAZINE HYDROCHLORIDE 50 MG/1
50 TABLET, FILM COATED ORAL 3 TIMES DAILY
Qty: 270 TABLET | Refills: 1 | Status: SHIPPED | OUTPATIENT
Start: 2022-10-10 | End: 2023-04-06

## 2022-10-10 RX ORDER — LISINOPRIL 5 MG/1
TABLET ORAL
Qty: 90 TABLET | Refills: 1 | Status: SHIPPED | OUTPATIENT
Start: 2022-10-10 | End: 2024-01-25

## 2022-10-10 RX ORDER — LISINOPRIL 5 MG/1
5 TABLET ORAL DAILY
Qty: 90 TABLET | Refills: 1 | Status: SHIPPED | OUTPATIENT
Start: 2022-10-10 | End: 2023-04-06

## 2022-10-10 RX ORDER — AMLODIPINE BESYLATE 10 MG/1
10 TABLET ORAL DAILY
Qty: 90 TABLET | Refills: 1 | Status: SHIPPED | OUTPATIENT
Start: 2022-10-10 | End: 2023-06-14

## 2022-10-10 ASSESSMENT — PAIN SCALES - GENERAL: PAINLEVEL: NO PAIN (0)

## 2022-10-31 DIAGNOSIS — M05.9 RHEUMATOID ARTHRITIS WITH POSITIVE RHEUMATOID FACTOR, INVOLVING UNSPECIFIED SITE (H): ICD-10-CM

## 2022-10-31 RX ORDER — PREDNISONE 5 MG/1
5 TABLET ORAL DAILY
Qty: 90 TABLET | Refills: 1 | Status: SHIPPED | OUTPATIENT
Start: 2022-10-31 | End: 2023-04-23

## 2022-11-21 ENCOUNTER — TELEPHONE (OUTPATIENT)
Dept: FAMILY MEDICINE | Facility: CLINIC | Age: 82
End: 2022-11-21

## 2022-11-21 DIAGNOSIS — B35.1 TOENAIL FUNGUS: Primary | ICD-10-CM

## 2022-11-23 RX ORDER — CICLOPIROX 80 MG/ML
SOLUTION TOPICAL
Qty: 6 ML | Refills: 1 | Status: SHIPPED | OUTPATIENT
Start: 2022-11-23 | End: 2023-01-29

## 2023-01-27 DIAGNOSIS — B35.1 TOENAIL FUNGUS: ICD-10-CM

## 2023-01-29 RX ORDER — CICLOPIROX 80 MG/ML
SOLUTION TOPICAL
Qty: 6.6 ML | Refills: 1 | Status: SHIPPED | OUTPATIENT
Start: 2023-01-29

## 2023-04-06 ENCOUNTER — TELEPHONE (OUTPATIENT)
Dept: FAMILY MEDICINE | Facility: CLINIC | Age: 83
End: 2023-04-06
Payer: COMMERCIAL

## 2023-04-06 DIAGNOSIS — I10 HYPERTENSION GOAL BP (BLOOD PRESSURE) < 140/90: ICD-10-CM

## 2023-04-06 RX ORDER — LISINOPRIL 5 MG/1
5 TABLET ORAL DAILY
Qty: 90 TABLET | Refills: 0 | Status: SHIPPED | OUTPATIENT
Start: 2023-04-06 | End: 2023-07-06

## 2023-04-06 RX ORDER — HYDRALAZINE HYDROCHLORIDE 50 MG/1
50 TABLET, FILM COATED ORAL 3 TIMES DAILY
Qty: 270 TABLET | Refills: 0 | Status: SHIPPED | OUTPATIENT
Start: 2023-04-06 | End: 2023-07-06

## 2023-04-06 NOTE — TELEPHONE ENCOUNTER
Patient called to clinic to request a new prescription for his ongoing toenail fungus. Current medication that he has been applying is not working. Was prescribed ciclopirox (PENLAC) 8 % external solution back on 11/21/22 and was refilled on 1/29/23.  Patient has had this issue ongoing since last fall. Notes he discussed it with PCP at last visit on 10/10/22 but there is no documentation regarding nail fungus and treatment. Appears patient touched base on this during 11/21 telephone encounter and prescription was given.    Per provider documentation in TE from 11/21, patient advised to follow up with appointment if not improving overall in 3-4 months or new concerns.    Reviewed this with patient, recommended follow up with provider since ongoing problem and current treatment is not effective.   Patient noted he is legally blind so is hard for him to tell the full status or condition of toenails but does admit that fungus seems to be involving all toenails on left foot and has been able to see well enough to place the prescription solution.  Patient agreed with office visit follow up.  Writer scheduled patient for clinic visit with PCP on Monday, April 10 at 4:30 pm.       Mariella Luz RN  Clinical Triage/Primary Care  [facility]

## 2023-04-10 ENCOUNTER — OFFICE VISIT (OUTPATIENT)
Dept: FAMILY MEDICINE | Facility: CLINIC | Age: 83
End: 2023-04-10
Payer: COMMERCIAL

## 2023-04-10 VITALS
BODY MASS INDEX: 28.91 KG/M2 | SYSTOLIC BLOOD PRESSURE: 131 MMHG | RESPIRATION RATE: 18 BRPM | HEART RATE: 57 BPM | DIASTOLIC BLOOD PRESSURE: 80 MMHG | WEIGHT: 184.2 LBS | OXYGEN SATURATION: 98 % | HEIGHT: 67 IN | TEMPERATURE: 98.1 F

## 2023-04-10 DIAGNOSIS — I10 HYPERTENSION GOAL BP (BLOOD PRESSURE) < 140/90: ICD-10-CM

## 2023-04-10 DIAGNOSIS — B35.1 TOENAIL FUNGUS: Primary | ICD-10-CM

## 2023-04-10 PROCEDURE — 99214 OFFICE O/P EST MOD 30 MIN: CPT | Performed by: FAMILY MEDICINE

## 2023-04-10 PROCEDURE — 36415 COLL VENOUS BLD VENIPUNCTURE: CPT | Performed by: FAMILY MEDICINE

## 2023-04-10 PROCEDURE — 80053 COMPREHEN METABOLIC PANEL: CPT | Performed by: FAMILY MEDICINE

## 2023-04-10 RX ORDER — FLUCONAZOLE 150 MG/1
TABLET ORAL
Qty: 24 TABLET | Refills: 1 | Status: SHIPPED | OUTPATIENT
Start: 2023-04-10 | End: 2023-09-26

## 2023-04-10 ASSESSMENT — PAIN SCALES - GENERAL: PAINLEVEL: NO PAIN (0)

## 2023-04-10 NOTE — PROGRESS NOTES
"  ASSESSMENT / PLAN:  (B35.1) Toenail fungus  (primary encounter diagnosis)  Comment: failed topical  Plan: fluconazole (DIFLUCAN) 150 MG tablet,         Comprehensive metabolic panel        Patient would like to try diflucan. Avoid ALCOHOL. Reveiwed risks and side effects of medication  Recheck in 6 months  Derm follow-up if not improving. Stop if side effect. Call/email with questions/concerns     (I10) Hypertension goal BP (blood pressure) < 140/90  Comment: stable  Plan: Comprehensive metabolic panel        Self-monitor. Continue meds. Recheck in 6 months  Sooner if worse. Chest pain or shortness of breath or cva symptoms. Call/email with questions/concerns         Subjective   Kenton is a 82 year old, presenting for the following health issues:    RECHECK (Toenail fungus)  follow-up toenail fungus. Given penlac topical and not helpful.   Advised doubling of hydralazine in late fall.   Outside blood pressure reading. No chest pain or shortness of breath.  Exercise - will do more with nicer weather.   Has stationary bike and some weights.-      4/10/2023     4:23 PM   Additional Questions   Roomed by ROSARIO Londono CMA   Accompanied by Self     HPI       Review of Systems         Objective  Physical Exam   /80   Pulse 57   Temp 98.1  F (36.7  C) (Oral)   Resp 18   Ht 1.689 m (5' 6.5\")   Wt 83.6 kg (184 lb 3.2 oz)   SpO2 98%   BMI 29.29 kg/m     GENERAL: healthy, alert and no distress  NECK: no adenopathy, no asymmetry, masses, or scars and thyroid normal to palpation  RESP: lungs clear to auscultation - no rales, rhonchi or wheezes  CV: regular rate and rhythm, normal S1 S2, no S3 or S4, no murmur, click or rub, no peripheral edema and peripheral pulses strong  ABDOMEN: soft, nontender, no hepatosplenomegaly, no masses and bowel sounds normal  MS: no gross musculoskeletal defects noted, no edema  SKIN: diffusely thickened toenails.   PSYCH: mentation appears normal, affect normal/bright            "

## 2023-04-11 LAB
ALBUMIN SERPL-MCNC: 3.9 G/DL (ref 3.4–5)
ALP SERPL-CCNC: 88 U/L (ref 40–150)
ALT SERPL W P-5'-P-CCNC: 25 U/L (ref 0–70)
ANION GAP SERPL CALCULATED.3IONS-SCNC: 3 MMOL/L (ref 3–14)
AST SERPL W P-5'-P-CCNC: 16 U/L (ref 0–45)
BILIRUB SERPL-MCNC: 0.6 MG/DL (ref 0.2–1.3)
BUN SERPL-MCNC: 47 MG/DL (ref 7–30)
CALCIUM SERPL-MCNC: 9.4 MG/DL (ref 8.5–10.1)
CHLORIDE BLD-SCNC: 111 MMOL/L (ref 94–109)
CO2 SERPL-SCNC: 23 MMOL/L (ref 20–32)
CREAT SERPL-MCNC: 1.73 MG/DL (ref 0.66–1.25)
GFR SERPL CREATININE-BSD FRML MDRD: 39 ML/MIN/1.73M2
GLUCOSE BLD-MCNC: 104 MG/DL (ref 70–99)
POTASSIUM BLD-SCNC: 5 MMOL/L (ref 3.4–5.3)
PROT SERPL-MCNC: 8 G/DL (ref 6.8–8.8)
SODIUM SERPL-SCNC: 137 MMOL/L (ref 133–144)

## 2023-04-23 ENCOUNTER — HEALTH MAINTENANCE LETTER (OUTPATIENT)
Age: 83
End: 2023-04-23

## 2023-04-23 DIAGNOSIS — M05.9 RHEUMATOID ARTHRITIS WITH POSITIVE RHEUMATOID FACTOR, INVOLVING UNSPECIFIED SITE (H): ICD-10-CM

## 2023-04-23 RX ORDER — PREDNISONE 5 MG/1
5 TABLET ORAL DAILY
Qty: 90 TABLET | Refills: 1 | Status: SHIPPED | OUTPATIENT
Start: 2023-04-23 | End: 2023-10-22

## 2023-05-07 DIAGNOSIS — E78.5 HYPERLIPIDEMIA LDL GOAL <100: ICD-10-CM

## 2023-05-08 RX ORDER — ATORVASTATIN CALCIUM 40 MG/1
40 TABLET, FILM COATED ORAL DAILY
Qty: 90 TABLET | Refills: 3 | Status: SHIPPED | OUTPATIENT
Start: 2023-05-08 | End: 2024-07-01

## 2023-06-14 DIAGNOSIS — I10 HYPERTENSION GOAL BP (BLOOD PRESSURE) < 140/90: ICD-10-CM

## 2023-06-14 RX ORDER — AMLODIPINE BESYLATE 10 MG/1
10 TABLET ORAL DAILY
Qty: 90 TABLET | Refills: 1 | Status: SHIPPED | OUTPATIENT
Start: 2023-06-14 | End: 2023-12-14

## 2023-07-06 DIAGNOSIS — I10 HYPERTENSION GOAL BP (BLOOD PRESSURE) < 140/90: ICD-10-CM

## 2023-07-06 RX ORDER — HYDRALAZINE HYDROCHLORIDE 50 MG/1
50 TABLET, FILM COATED ORAL 3 TIMES DAILY
Qty: 270 TABLET | Refills: 2 | Status: SHIPPED | OUTPATIENT
Start: 2023-07-06 | End: 2024-03-25

## 2023-07-06 RX ORDER — LISINOPRIL 5 MG/1
5 TABLET ORAL DAILY
Qty: 90 TABLET | Refills: 0 | Status: SHIPPED | OUTPATIENT
Start: 2023-07-06 | End: 2023-10-03

## 2023-07-06 NOTE — TELEPHONE ENCOUNTER
Prescription approved per [facility] Refill Protocol.  Oneyda Chavez, RN  [facility] Triage Nurse

## 2023-09-26 DIAGNOSIS — B35.1 TOENAIL FUNGUS: ICD-10-CM

## 2023-09-26 RX ORDER — FLUCONAZOLE 150 MG/1
TABLET ORAL
Qty: 24 TABLET | Refills: 0 | Status: SHIPPED | OUTPATIENT
Start: 2023-09-26 | End: 2023-12-18

## 2023-10-01 DIAGNOSIS — I10 HYPERTENSION GOAL BP (BLOOD PRESSURE) < 140/90: ICD-10-CM

## 2023-10-03 RX ORDER — LISINOPRIL 5 MG/1
5 TABLET ORAL DAILY
Qty: 90 TABLET | Refills: 0 | Status: SHIPPED | OUTPATIENT
Start: 2023-10-03 | End: 2024-01-02

## 2023-10-22 DIAGNOSIS — M05.9 RHEUMATOID ARTHRITIS WITH POSITIVE RHEUMATOID FACTOR, INVOLVING UNSPECIFIED SITE (H): ICD-10-CM

## 2023-10-22 RX ORDER — PREDNISONE 5 MG/1
5 TABLET ORAL DAILY
Qty: 90 TABLET | Refills: 1 | Status: SHIPPED | OUTPATIENT
Start: 2023-10-22 | End: 2024-03-18

## 2023-12-04 ENCOUNTER — TELEPHONE (OUTPATIENT)
Dept: PHARMACY | Facility: OTHER | Age: 83
End: 2023-12-04
Payer: COMMERCIAL

## 2023-12-04 NOTE — TELEPHONE ENCOUNTER
MTM referral from: Patient's insurance ([employer] pay[employer] products)    MTM referral outreach attempt #1 on December 4, 2023 at 2:12 PM      Outcome: Patient is not interested at this time because his son recently completed a telephone pharmacist medication review.    Gertrude Fong, PharmD, [facility]  Medication Therapy Management Pharmacist

## 2023-12-14 DIAGNOSIS — I10 HYPERTENSION GOAL BP (BLOOD PRESSURE) < 140/90: ICD-10-CM

## 2023-12-14 RX ORDER — AMLODIPINE BESYLATE 10 MG/1
TABLET ORAL
Qty: 90 TABLET | Refills: 0 | Status: SHIPPED | OUTPATIENT
Start: 2023-12-14 | End: 2024-03-13

## 2023-12-14 NOTE — LETTER
December 15, 2023    Kenton Townsend  [address]  [place] [address]    Dear Kenton,       We recently received a refill request for amLODIPine (NORVASC) 10 MG tablet .  We have refilled this for a one time 90 day supply only because you are due for a:    Wellness exam office visit and fasting lab appointment-needed in the next 2-3 months per Dr Vincent.      Please schedule an office visit with your provider and a lab appointment 4-5 days prior to the office visit.     Please call at your earliest convenience so that there will not be a delay with your future refills.          Thank you,   Your [facility] Team/  [phone]

## 2023-12-18 DIAGNOSIS — B35.1 TOENAIL FUNGUS: ICD-10-CM

## 2023-12-18 RX ORDER — FLUCONAZOLE 150 MG/1
TABLET ORAL
Qty: 24 TABLET | Refills: 0 | Status: SHIPPED | OUTPATIENT
Start: 2023-12-18 | End: 2024-03-18

## 2023-12-18 NOTE — LETTER
December 18, 2023    Kenton Towsnend  [address]  [place] [address]    Dear Kenton,       We recently received a refill request for fluconazole (DIFLUCAN) 150 MG tablet .  We have refilled this, but  you are due for a:    Wellness exam with previsit fasting labs in next 2-3months.      Please call at your earliest convenience so that there will not be a delay with your future refills.          Thank you,   Your [facility] Team/  [phone]

## 2023-12-30 DIAGNOSIS — I10 HYPERTENSION GOAL BP (BLOOD PRESSURE) < 140/90: ICD-10-CM

## 2024-01-02 RX ORDER — LISINOPRIL 5 MG/1
TABLET ORAL
Qty: 90 TABLET | Refills: 0 | Status: SHIPPED | OUTPATIENT
Start: 2024-01-02 | End: 2024-01-25

## 2024-01-08 ENCOUNTER — DOCUMENTATION ONLY (OUTPATIENT)
Dept: FAMILY MEDICINE | Facility: CLINIC | Age: 84
End: 2024-01-08
Payer: COMMERCIAL

## 2024-01-08 DIAGNOSIS — N18.30 STAGE 3 CHRONIC KIDNEY DISEASE, UNSPECIFIED WHETHER STAGE 3A OR 3B CKD (H): Primary | ICD-10-CM

## 2024-01-08 DIAGNOSIS — E78.5 HYPERLIPIDEMIA LDL GOAL <130: ICD-10-CM

## 2024-01-08 NOTE — PROGRESS NOTES
Kenton Townsend has an upcoming lab appointment:    Future Appointments   Date Time Provider Department Center   1/22/2024  9:45 AM AN [facility]   1/25/2024 12:00 PM Geovanni Vincent MD [facility]     There is no order available. Please review and place either future orders or HMPO (Review of Health Maintenance Protocol Orders), as appropriate.    Health Maintenance Due   Topic    ANNUAL REVIEW OF HM ORDERS     MICROALBUMIN     LIPID     HEMOGLOBIN      Mary CHT

## 2024-01-22 ENCOUNTER — LAB (OUTPATIENT)
Dept: LAB | Facility: CLINIC | Age: 84
End: 2024-01-22
Payer: COMMERCIAL

## 2024-01-22 DIAGNOSIS — N18.30 STAGE 3 CHRONIC KIDNEY DISEASE, UNSPECIFIED WHETHER STAGE 3A OR 3B CKD (H): ICD-10-CM

## 2024-01-22 DIAGNOSIS — E78.5 HYPERLIPIDEMIA LDL GOAL <130: ICD-10-CM

## 2024-01-22 LAB
ALBUMIN SERPL BCG-MCNC: 4 G/DL (ref 3.5–5.2)
ANION GAP SERPL CALCULATED.3IONS-SCNC: 14 MMOL/L (ref 7–15)
BUN SERPL-MCNC: 95.7 MG/DL (ref 8–23)
CALCIUM SERPL-MCNC: 9.2 MG/DL (ref 8.8–10.2)
CHLORIDE SERPL-SCNC: 110 MMOL/L (ref 98–107)
CHOLEST SERPL-MCNC: 111 MG/DL
CREAT SERPL-MCNC: 3.11 MG/DL (ref 0.67–1.17)
DEPRECATED HCO3 PLAS-SCNC: 16 MMOL/L (ref 22–29)
EGFRCR SERPLBLD CKD-EPI 2021: 19 ML/MIN/1.73M2
ERYTHROCYTE [DISTWIDTH] IN BLOOD BY AUTOMATED COUNT: 14.1 % (ref 10–15)
FASTING STATUS PATIENT QL REPORTED: YES
GLUCOSE SERPL-MCNC: 143 MG/DL (ref 70–99)
HCT VFR BLD AUTO: 29.4 % (ref 40–53)
HDLC SERPL-MCNC: 22 MG/DL
HGB BLD-MCNC: 9.6 G/DL (ref 13.3–17.7)
LDLC SERPL CALC-MCNC: 29 MG/DL
MCH RBC QN AUTO: 32.2 PG (ref 26.5–33)
MCHC RBC AUTO-ENTMCNC: 32.7 G/DL (ref 31.5–36.5)
MCV RBC AUTO: 99 FL (ref 78–100)
NONHDLC SERPL-MCNC: 89 MG/DL
PHOSPHATE SERPL-MCNC: 3.4 MG/DL (ref 2.5–4.5)
PLATELET # BLD AUTO: 103 10E3/UL (ref 150–450)
POTASSIUM SERPL-SCNC: 5.3 MMOL/L (ref 3.4–5.3)
RBC # BLD AUTO: 2.98 10E6/UL (ref 4.4–5.9)
SODIUM SERPL-SCNC: 140 MMOL/L (ref 135–145)
TRIGL SERPL-MCNC: 298 MG/DL
WBC # BLD AUTO: 4.8 10E3/UL (ref 4–11)

## 2024-01-22 PROCEDURE — 80061 LIPID PANEL: CPT

## 2024-01-22 PROCEDURE — 80069 RENAL FUNCTION PANEL: CPT

## 2024-01-22 PROCEDURE — 36415 COLL VENOUS BLD VENIPUNCTURE: CPT

## 2024-01-22 PROCEDURE — 85027 COMPLETE CBC AUTOMATED: CPT

## 2024-01-25 ENCOUNTER — OFFICE VISIT (OUTPATIENT)
Dept: FAMILY MEDICINE | Facility: CLINIC | Age: 84
End: 2024-01-25
Payer: COMMERCIAL

## 2024-01-25 VITALS
HEART RATE: 63 BPM | BODY MASS INDEX: 28.06 KG/M2 | WEIGHT: 178.8 LBS | DIASTOLIC BLOOD PRESSURE: 57 MMHG | OXYGEN SATURATION: 100 % | SYSTOLIC BLOOD PRESSURE: 129 MMHG | TEMPERATURE: 97.7 F | RESPIRATION RATE: 20 BRPM | HEIGHT: 67 IN

## 2024-01-25 DIAGNOSIS — N18.2 ANEMIA OF CHRONIC RENAL FAILURE, STAGE 2 (MILD): ICD-10-CM

## 2024-01-25 DIAGNOSIS — I63.9 ACUTE CVA (CEREBROVASCULAR ACCIDENT) (H): ICD-10-CM

## 2024-01-25 DIAGNOSIS — I10 HYPERTENSION GOAL BP (BLOOD PRESSURE) < 140/90: Primary | ICD-10-CM

## 2024-01-25 DIAGNOSIS — N18.30 STAGE 3 CHRONIC KIDNEY DISEASE, UNSPECIFIED WHETHER STAGE 3A OR 3B CKD (H): ICD-10-CM

## 2024-01-25 DIAGNOSIS — K29.70 GASTRITIS, PRESENCE OF BLEEDING UNSPECIFIED, UNSPECIFIED CHRONICITY, UNSPECIFIED GASTRITIS TYPE: ICD-10-CM

## 2024-01-25 DIAGNOSIS — D63.1 ANEMIA OF CHRONIC RENAL FAILURE, STAGE 2 (MILD): ICD-10-CM

## 2024-01-25 PROCEDURE — 99214 OFFICE O/P EST MOD 30 MIN: CPT | Performed by: FAMILY MEDICINE

## 2024-01-25 RX ORDER — FERROUS SULFATE 325(65) MG
325 TABLET ORAL
Qty: 90 TABLET | Refills: 1 | Status: SHIPPED | OUTPATIENT
Start: 2024-01-25 | End: 2024-07-29

## 2024-01-25 RX ORDER — RESPIRATORY SYNCYTIAL VIRUS VACCINE 120MCG/0.5
0.5 KIT INTRAMUSCULAR ONCE
Qty: 1 EACH | Refills: 0 | Status: CANCELLED | OUTPATIENT
Start: 2024-01-25 | End: 2024-01-25

## 2024-01-25 RX ORDER — ASPIRIN 81 MG/1
TABLET ORAL
COMMUNITY
Start: 2024-01-25

## 2024-01-25 ASSESSMENT — PAIN SCALES - GENERAL: PAINLEVEL: MILD PAIN (2)

## 2024-01-25 NOTE — PROGRESS NOTES
ASSESSMENT / PLAN:  (I10) Hypertension goal BP (blood pressure) < 140/90  (primary encounter diagnosis)  Comment: stabl  Plan: Renal panel        Will drop lisinopril with kidney concerns. Continue other meds and self-monitor. Will need nephrology help if not imrpoving    (N18.30) Stage 3 chronic kidney disease, unspecified whether stage 3a or 3b CKD (H)  Comment: likely from hgb drop  Plan: Renal panel, CBC with platelets, Ferritin        Continue lots of water and limit caffeine. Continue avoid nsaids. Repeat non-fasting labs in 3-4 weeks. Nephrology follow-up if not improving. Expected course and warning signs reviewed. Continue monitor blood pressure and urine flow    (N18.2,  D63.1) Anemia of chronic renal failure, stage 2 (mild)  Comment: possibly from ulcer. Overall doing ok  Plan: ferrous sulfate (FEROSUL) 325 (65 Fe) MG         tablet, Renal panel, CBC with platelets,         Ferritin        Move asa to every other day, start iron and start ppi. Egd/colonoscopy if not improving. To ER if overt symptoms of bleeding/worsening fatigue.     (K29.70) Gastritis, presence of bleeding unspecified, unspecified chronicity, unspecified gastritis type  Comment: asa  Plan: omeprazole (PRILOSEC) 20 MG DR capsule, ferrous        sulfate (FEROSUL) 325 (65 Fe) MG tablet        Avoid nsaids/ ALCOHOL. Egd if needed. Expected course and warning signs reviewed. Asa every other day     (I63.9) Acute CVA (cerebrovascular accident) (H)  Plan: aspirin 81 MG EC tablet        Every other day with anemia.       Adeola Fung is a 83 year old, presenting for the following health issues:  Follow-up cva, cri, anemia, RA, high cholesterol and htn.    History hemorrhoids and polyps on colonoscpoy  No black/bloody stools.  No increased fatigue. No nausea, vomiting or diarrhea or constipation. Normal stools. No abdominal pain. No gerd. No chest pain or shortness of breath.   Mild shortness of breath. No hematuria or  dysuria and flow  "ok.   Outside blood pressure borderline high.   Likes water -intake.  Drinks coffee - 3 cups/day.  Limited ALCOHOL - mixed x1 /week.   No nsaids.  Tylenol pm.   Taking asa daily.  History ulcer in past. No GERD meds.   Recheck Medication      1/25/2024    11:36 AM   Additional Questions   Roomed by ROSARIO ayala [facility]     HPI           Objective    /57   Pulse 63   Temp 97.7  F (36.5  C) (Oral)   Resp 20   Ht 1.708 m (5' 7.25\")   Wt 81.1 kg (178 lb 12.8 oz)   SpO2 100%   BMI 27.80 kg/m    Body mass index is 27.8 kg/m .  Physical Exam   GENERAL: alert and no distress  EYES: Eyes grossly normal to inspection, PERRL and conjunctivae and sclerae normal  HENT: ear canals and TM's normal, nose and mouth without ulcers or lesions  NECK: no adenopathy, no asymmetry, masses, or scars  RESP: lungs clear to auscultation - no rales, rhonchi or wheezes  CV: regular rate and rhythm, normal S1 S2, no S3 or S4, no murmur, click or rub, no peripheral edema   ABDOMEN: soft, nontender, no hepatosplenomegaly, no masses and bowel sounds normal  MS: no gross musculoskeletal defects noted, no edema  SKIN: no suspicious lesions or rashes  PSYCH: mentation appears normal, affect normal/bright          Signed Electronically by: Geovanni Vincent MD    "

## 2024-02-27 ENCOUNTER — LAB (OUTPATIENT)
Dept: LAB | Facility: CLINIC | Age: 84
End: 2024-02-27
Payer: COMMERCIAL

## 2024-02-27 DIAGNOSIS — N18.2 ANEMIA OF CHRONIC RENAL FAILURE, STAGE 2 (MILD): ICD-10-CM

## 2024-02-27 DIAGNOSIS — N18.30 STAGE 3 CHRONIC KIDNEY DISEASE, UNSPECIFIED WHETHER STAGE 3A OR 3B CKD (H): ICD-10-CM

## 2024-02-27 DIAGNOSIS — I10 HYPERTENSION GOAL BP (BLOOD PRESSURE) < 140/90: ICD-10-CM

## 2024-02-27 DIAGNOSIS — D63.1 ANEMIA OF CHRONIC RENAL FAILURE, STAGE 2 (MILD): ICD-10-CM

## 2024-02-27 LAB
ALBUMIN SERPL BCG-MCNC: 4.3 G/DL (ref 3.5–5.2)
ANION GAP SERPL CALCULATED.3IONS-SCNC: 11 MMOL/L (ref 7–15)
BUN SERPL-MCNC: 49.1 MG/DL (ref 8–23)
CALCIUM SERPL-MCNC: 9.4 MG/DL (ref 8.8–10.2)
CHLORIDE SERPL-SCNC: 108 MMOL/L (ref 98–107)
CREAT SERPL-MCNC: 2.22 MG/DL (ref 0.67–1.17)
DEPRECATED HCO3 PLAS-SCNC: 21 MMOL/L (ref 22–29)
EGFRCR SERPLBLD CKD-EPI 2021: 29 ML/MIN/1.73M2
ERYTHROCYTE [DISTWIDTH] IN BLOOD BY AUTOMATED COUNT: 14.6 % (ref 10–15)
FERRITIN SERPL-MCNC: 146 NG/ML (ref 31–409)
GLUCOSE SERPL-MCNC: 146 MG/DL (ref 70–99)
HCT VFR BLD AUTO: 32.4 % (ref 40–53)
HGB BLD-MCNC: 10.6 G/DL (ref 13.3–17.7)
MCH RBC QN AUTO: 32.6 PG (ref 26.5–33)
MCHC RBC AUTO-ENTMCNC: 32.7 G/DL (ref 31.5–36.5)
MCV RBC AUTO: 100 FL (ref 78–100)
PHOSPHATE SERPL-MCNC: 2.7 MG/DL (ref 2.5–4.5)
PLATELET # BLD AUTO: 104 10E3/UL (ref 150–450)
POTASSIUM SERPL-SCNC: 4.6 MMOL/L (ref 3.4–5.3)
RBC # BLD AUTO: 3.25 10E6/UL (ref 4.4–5.9)
SODIUM SERPL-SCNC: 140 MMOL/L (ref 135–145)
WBC # BLD AUTO: 5.4 10E3/UL (ref 4–11)

## 2024-02-27 PROCEDURE — 85027 COMPLETE CBC AUTOMATED: CPT

## 2024-02-27 PROCEDURE — 36415 COLL VENOUS BLD VENIPUNCTURE: CPT

## 2024-02-27 PROCEDURE — 82728 ASSAY OF FERRITIN: CPT

## 2024-02-27 PROCEDURE — 80069 RENAL FUNCTION PANEL: CPT

## 2024-03-01 ENCOUNTER — VIRTUAL VISIT (OUTPATIENT)
Dept: FAMILY MEDICINE | Facility: CLINIC | Age: 84
End: 2024-03-01
Payer: COMMERCIAL

## 2024-03-01 DIAGNOSIS — N18.2 ANEMIA OF CHRONIC RENAL FAILURE, STAGE 2 (MILD): ICD-10-CM

## 2024-03-01 DIAGNOSIS — D63.1 ANEMIA OF CHRONIC RENAL FAILURE, STAGE 2 (MILD): ICD-10-CM

## 2024-03-01 DIAGNOSIS — N18.30 STAGE 3 CHRONIC KIDNEY DISEASE, UNSPECIFIED WHETHER STAGE 3A OR 3B CKD (H): ICD-10-CM

## 2024-03-01 DIAGNOSIS — I10 HYPERTENSION GOAL BP (BLOOD PRESSURE) < 140/90: Primary | ICD-10-CM

## 2024-03-01 PROCEDURE — 99443 PR PHYSICIAN TELEPHONE EVALUATION 21-30 MIN: CPT | Mod: 93 | Performed by: FAMILY MEDICINE

## 2024-03-01 NOTE — PROGRESS NOTES
"    Instructions Relayed to Patient by Virtual Roomer:     Patient is active on [employer]:   Relayed following to patient: \"It looks like you are active on [employer], are you able to join the visit this way? If not, do you need us to send you a link now or would you like your provider to send a link via text or email when they are ready to initiate the visit?\"    Reminded patient to ensure they were logged on to virtual visit by arrival time listed. Documented in appointment notes if patient had flexibility to initiate visit sooner than arrival time. If pediatric virtual visit, ensured pediatric patient along with parent/guardian will be present for video visit.     Patient offered the website [email or web address] and/or phone number to [employer] Help line: [phone]    Kenton is a 83 year old who is being evaluated via a billable telephone visit.      What phone number would you like to be contacted at? [phone]   How would you like to obtain your AVS? [employer]  Originating Location (pt. Location): Home    Distant Location (provider location):  On-site    ASSESSMENT / PLAN:  (I10) Hypertension goal BP (blood pressure) < 140/90  (primary encounter diagnosis)  Comment: stable  Plan: continue hold lisinorpil and monitor. Recheck in 3 months  Sooner if worse. Chest pain or shortness of breath or neuro symptoms to er.     (N18.30) Stage 3 chronic kidney disease, unspecified whether stage 3a or 3b CKD (H)  Comment: improving  Plan: continue montior blood pressure and keep hydated. Recheck in 3 months      (N18.2,  D63.1) Anemia of chronic renal failure, stage 2 (mild)  Comment: improving  Plan: continue iron. Recheck in 3 months            Subjective   Kenton is a 83 year old, presenting for the following health issues:  Results  Follow-up anemnia, CRI and htn. Dropped lisinopril and started iron pill/prilosec and made asa every other day 5 weeks ago.   Energy doing. No abdominal pain or bloody sugars. " Outside blood pressure readings doing ok   No chest pain or shortness of breath      3/1/2024    12:48 PM   Additional Questions   Roomed by Sadi ROMERO   Accompanied by Self     History of Present Illness       Reason for visit:  Lab Results                    Objective           Vitals:  No vitals were obtained today due to virtual visit.    Physical Exam   General: Alert and no distress //Respiratory: No audible wheeze, cough, or shortness of breath // Psychiatric:  Appropriate affect, tone, and pace of words            Phone call duration: 21 minutes  Signed Electronically by: Geovanni Vincent MD

## 2024-03-13 DIAGNOSIS — I10 HYPERTENSION GOAL BP (BLOOD PRESSURE) < 140/90: ICD-10-CM

## 2024-03-13 RX ORDER — AMLODIPINE BESYLATE 10 MG/1
TABLET ORAL
Qty: 90 TABLET | Refills: 3 | Status: SHIPPED | OUTPATIENT
Start: 2024-03-13

## 2024-03-18 DIAGNOSIS — M05.9 RHEUMATOID ARTHRITIS WITH POSITIVE RHEUMATOID FACTOR, INVOLVING UNSPECIFIED SITE (H): ICD-10-CM

## 2024-03-18 DIAGNOSIS — B35.1 TOENAIL FUNGUS: ICD-10-CM

## 2024-03-18 RX ORDER — PREDNISONE 5 MG/1
5 TABLET ORAL DAILY
Qty: 30 TABLET | Refills: 0 | Status: SHIPPED | OUTPATIENT
Start: 2024-03-18 | End: 2024-05-14

## 2024-03-18 RX ORDER — FLUCONAZOLE 150 MG/1
TABLET ORAL
Qty: 24 TABLET | Refills: 0 | Status: SHIPPED | OUTPATIENT
Start: 2024-03-18

## 2024-03-18 NOTE — TELEPHONE ENCOUNTER
Ok as long as his arthritis is stable and been on for a long time. I'm ok with refilling. Recheck in 6 months  Geovanni Vincent MD

## 2024-03-18 NOTE — TELEPHONE ENCOUNTER
I don't now what the long term plan is with rheumatology? Prednisone is tough on the body long term. Was this the plan with rheumatology?  If haven't seen in a few years then might need a tune up. Geovanni Vincent MD

## 2024-03-18 NOTE — TELEPHONE ENCOUNTER
Pt notified of provider message as written.  Pt verbalized good understanding.  Estela Garnica BSN, RN

## 2024-03-18 NOTE — TELEPHONE ENCOUNTER
Pt notified of provider message as written. He states he does use the prednisone and thinks he has been on it for a long time. He has not seen a rheumatology provider in a long time. He states he is blind it and it hard for him to get a ride.      He is asking if the labs he just had done showed any problems or would they show damage from the prednisone?  If labs were normal does he need to see rheumatology?    Estela MALDONADON, RN

## 2024-03-25 DIAGNOSIS — I10 HYPERTENSION GOAL BP (BLOOD PRESSURE) < 140/90: ICD-10-CM

## 2024-03-25 RX ORDER — HYDRALAZINE HYDROCHLORIDE 50 MG/1
50 TABLET, FILM COATED ORAL 3 TIMES DAILY
Qty: 270 TABLET | Refills: 2 | Status: SHIPPED | OUTPATIENT
Start: 2024-03-25

## 2024-05-02 DIAGNOSIS — D63.1 ANEMIA OF CHRONIC RENAL FAILURE, STAGE 2 (MILD): ICD-10-CM

## 2024-05-02 DIAGNOSIS — N18.2 ANEMIA OF CHRONIC RENAL FAILURE, STAGE 2 (MILD): ICD-10-CM

## 2024-05-02 DIAGNOSIS — K29.70 GASTRITIS, PRESENCE OF BLEEDING UNSPECIFIED, UNSPECIFIED CHRONICITY, UNSPECIFIED GASTRITIS TYPE: ICD-10-CM

## 2024-05-03 RX ORDER — FERROUS SULFATE 325(65) MG
325 TABLET ORAL
Qty: 90 TABLET | Refills: 1 | OUTPATIENT
Start: 2024-05-03

## 2024-05-14 DIAGNOSIS — M05.9 RHEUMATOID ARTHRITIS WITH POSITIVE RHEUMATOID FACTOR, INVOLVING UNSPECIFIED SITE (H): ICD-10-CM

## 2024-05-14 RX ORDER — PREDNISONE 5 MG/1
5 TABLET ORAL DAILY
Qty: 30 TABLET | Refills: 0 | Status: SHIPPED | OUTPATIENT
Start: 2024-05-14 | End: 2024-06-11

## 2024-06-11 DIAGNOSIS — M05.9 RHEUMATOID ARTHRITIS WITH POSITIVE RHEUMATOID FACTOR, INVOLVING UNSPECIFIED SITE (H): ICD-10-CM

## 2024-06-11 RX ORDER — PREDNISONE 5 MG/1
5 TABLET ORAL DAILY
Qty: 30 TABLET | Refills: 2 | Status: SHIPPED | OUTPATIENT
Start: 2024-06-11

## 2024-06-29 DIAGNOSIS — E78.5 HYPERLIPIDEMIA LDL GOAL <100: ICD-10-CM

## 2024-06-30 ENCOUNTER — HEALTH MAINTENANCE LETTER (OUTPATIENT)
Age: 84
End: 2024-06-30

## 2024-07-01 RX ORDER — ATORVASTATIN CALCIUM 40 MG/1
TABLET, FILM COATED ORAL
Qty: 90 TABLET | Refills: 1 | Status: SHIPPED | OUTPATIENT
Start: 2024-07-01

## 2024-07-19 DIAGNOSIS — K29.70 GASTRITIS, PRESENCE OF BLEEDING UNSPECIFIED, UNSPECIFIED CHRONICITY, UNSPECIFIED GASTRITIS TYPE: ICD-10-CM

## 2024-07-27 DIAGNOSIS — N18.2 ANEMIA OF CHRONIC RENAL FAILURE, STAGE 2 (MILD): ICD-10-CM

## 2024-07-27 DIAGNOSIS — K29.70 GASTRITIS, PRESENCE OF BLEEDING UNSPECIFIED, UNSPECIFIED CHRONICITY, UNSPECIFIED GASTRITIS TYPE: ICD-10-CM

## 2024-07-27 DIAGNOSIS — D63.1 ANEMIA OF CHRONIC RENAL FAILURE, STAGE 2 (MILD): ICD-10-CM

## 2024-07-29 RX ORDER — FERROUS SULFATE 325(65) MG
325 TABLET ORAL
Qty: 90 TABLET | Refills: 1 | Status: SHIPPED | OUTPATIENT
Start: 2024-07-29

## 2024-09-12 ENCOUNTER — OFFICE VISIT (OUTPATIENT)
Dept: FAMILY MEDICINE | Facility: CLINIC | Age: 84
End: 2024-09-12
Payer: COMMERCIAL

## 2024-09-12 VITALS
SYSTOLIC BLOOD PRESSURE: 153 MMHG | OXYGEN SATURATION: 96 % | HEIGHT: 67 IN | HEART RATE: 77 BPM | DIASTOLIC BLOOD PRESSURE: 69 MMHG | RESPIRATION RATE: 22 BRPM | BODY MASS INDEX: 27 KG/M2 | TEMPERATURE: 97.9 F | WEIGHT: 172 LBS

## 2024-09-12 DIAGNOSIS — M54.16 LUMBAR RADICULOPATHY: Primary | ICD-10-CM

## 2024-09-12 DIAGNOSIS — N18.30 STAGE 3 CHRONIC KIDNEY DISEASE, UNSPECIFIED WHETHER STAGE 3A OR 3B CKD (H): ICD-10-CM

## 2024-09-12 PROCEDURE — G2211 COMPLEX E/M VISIT ADD ON: HCPCS | Performed by: FAMILY MEDICINE

## 2024-09-12 PROCEDURE — 99214 OFFICE O/P EST MOD 30 MIN: CPT | Performed by: FAMILY MEDICINE

## 2024-09-12 RX ORDER — TIZANIDINE 2 MG/1
2 TABLET ORAL 2 TIMES DAILY PRN
Qty: 30 TABLET | Refills: 2 | Status: SHIPPED | OUTPATIENT
Start: 2024-09-12

## 2024-09-12 ASSESSMENT — PAIN SCALES - GENERAL: PAINLEVEL: NO PAIN (1)

## 2024-09-12 ASSESSMENT — ENCOUNTER SYMPTOMS: BACK PAIN: 1

## 2024-09-12 NOTE — PROGRESS NOTES
ASSESSMENT / PLAN:  (M54.16) Lumbar radiculopathy  (primary encounter diagnosis)  Comment: needs help  Plan: tiZANidine (ZANAFLEX) 2 MG tablet, Spine          Referral        Reveiwed risks and side effects of medication  Follow-up spinal specialist to discuss options including injections. Not a candidate for a major surgery but maybe mild if can help. Continue tylenol     (N18.30) CKD (chronic kidney disease) stage 3, GFR 30-59 ml/min (H)  Comment: blood pressure a little high  Plan: continue avoid nsaids and push water. Continue self-monitor blood pressure. Will do wellness exam in a coupel months with previsit NON-fasting labs. Chest pain or shortness of breath to er.     The longitudinal plan of care for the diagnosis(es)/condition(s) as documented were addressed during this visit. Due to the added complexity in care, I will continue to support Kenton in the subsequent management and with ongoing continuity of care.      Adeola Fung is a 84 year old, presenting for the following health issues:  Back pain - history L4 vertebral fracture and herniated discs.   Follow-up anemnia, CRI and htn   Lisinopril on hold  No epidural in past. Pain into bilateral legs and some weakness.   Tylenol prn. No ibuprofen.   Outside blood pressure readings ok recently.   No chest pain. Water intake. Urine ok. Some  ankle swelling.   Had some milk the AM.   Seen nephrology in past.  Back Pain (Pt is requesting a referral for a spinal injection. )      9/12/2024     8:29 AM   Additional Questions   Roomed by Migdalia   Accompanied by self         9/12/2024     8:29 AM   Patient Reported Additional Medications   Patient reports taking the following new medications n/a     Back Pain     History of Present Illness       Reason for visit:  Back pain - referral    He eats 0-1 servings of fruits and vegetables daily.He consumes 0 sweetened beverage(s) daily.He exercises with enough effort to increase his heart rate 10 to 19  "minutes per day.  He exercises with enough effort to increase his heart rate 3 or less days per week.   He is taking medications regularly.                   Objective    BP (!) 153/69   Pulse 77   Temp 97.9  F (36.6  C) (Tympanic)   Resp 22   Ht 1.708 m (5' 7.25\")   Wt 78 kg (172 lb)   SpO2 96%   BMI 26.74 kg/m      Physical Exam   GENERAL: alert and no distress  EYES: Eyes grossly normal to inspection, PERRL and conjunctivae and sclerae normal  RESP: lungs clear to auscultation - no rales, rhonchi or wheezes  CV: regular rate and rhythm, normal S1 S2, no S3 or S4, no murmur, click or rub, no peripheral edema   ABDOMEN: soft, nontender, no hepatosplenomegaly, no masses and bowel sounds normal  MS: tight lower para-spinous muscles.   SKIN: no suspicious lesions or rashes  NEURO: Normal strength and tone, mentation intact and speech normal  NEURO: pain with bilateral straight leg raises.   PSYCH: mentation appears normal, affect normal/bright            Signed Electronically by: Geovanni Vincent MD    "

## 2024-09-24 ENCOUNTER — OFFICE VISIT (OUTPATIENT)
Dept: NEUROSURGERY | Facility: CLINIC | Age: 84
End: 2024-09-24
Attending: FAMILY MEDICINE
Payer: COMMERCIAL

## 2024-09-24 VITALS
HEART RATE: 54 BPM | SYSTOLIC BLOOD PRESSURE: 162 MMHG | HEIGHT: 67 IN | DIASTOLIC BLOOD PRESSURE: 70 MMHG | WEIGHT: 172 LBS | BODY MASS INDEX: 27 KG/M2

## 2024-09-24 DIAGNOSIS — M54.16 LUMBAR RADICULOPATHY: ICD-10-CM

## 2024-09-24 PROCEDURE — 99213 OFFICE O/P EST LOW 20 MIN: CPT | Performed by: NURSE PRACTITIONER

## 2024-09-24 ASSESSMENT — PAIN SCALES - GENERAL: PAINLEVEL: SEVERE PAIN (7)

## 2024-09-24 NOTE — PROGRESS NOTES
"[facility]  Neurosurgery Follow Up:    HPI: 84M who has been seen in the clinic in the past for L4 compression fracture who presents for re-evaluation. He reports new low back and bilateral leg pain. He states these symptoms were not present prior to his last lumbar MRI. He describes generalized low back pain. His leg pain is present in the bilateral posterior thighs to the ankles. He has neuropathy in both feet. No overt weakness, however he does not feel as strong. He is inquiring about injection therapy.    Medical, surgical, family, and social history unchanged since prior exam.  Exam:  Constitutional:  Alert, well nourished, NAD.  HEENT: Normocephalic, atraumatic.   Pulm:  Without shortness of breath   CV:  No pitting edema of BLE.      Vital Signs:  BP (!) 162/70   Pulse 54   Ht 5' 7.25\" (1.708 m)   Wt 172 lb (78 kg)   BMI 26.74 kg/m      Neurological:  Awake  Alert  Oriented x 3  Motor exam:        IP Q DF PF EHL  R   5  5   5   5    5  L   5  5   5   5    5     Able to spontaneously move L/E bilaterally  Sensation intact throughout all L/E dermatomes     Imaging: No recent imaging to review.    A/P: Chronic bilateral low back pain with bilateral sciatica  Since symptoms were not present at last lumbar MRI, would recommend updated lumbar MRI at this time. Will begin PT as well. He would like to have the MRI completed at [facility]. He should contact my office when the MRI is complete and we can retrieve those records. He verbalized understanding and agreement.   Patient Instructions   -Lumbar MRI ordered. We will fax this referral to [facility] for you.   -When your lumbar MRI has been completed, please contact my office so we can obtain those records.  -Physical therapy ordered. They will contact you to schedule.  -Please contact our clinic with questions or concerns at [phone].    Melissa Dunlap, ADELA  [facility]  [address] [place] [address] " [facility]  [address]  [place] [address]  Tel [phone]  Fax [phone]

## 2024-09-24 NOTE — LETTER
"9/24/2024      Kenton Townsend  [address]  [place] [address]      Dear Colleague,    Thank you for referring your patient, Kenton Townsend, to the [facility]. Please see a copy of my visit note below.    [facility]surgery  Neurosurgery Follow Up:    HPI: 84M who has been seen in the clinic in the past for L4 compression fracture who presents for re-evaluation. He reports new low back and bilateral leg pain. He states these symptoms were not present prior to his last lumbar MRI. He describes generalized low back pain. His leg pain is present in the bilateral posterior thighs to the ankles. He has neuropathy in both feet. No overt weakness, however he does not feel as strong. He is inquiring about injection therapy.    Medical, surgical, family, and social history unchanged since prior exam.  Exam:  Constitutional:  Alert, well nourished, NAD.  HEENT: Normocephalic, atraumatic.   Pulm:  Without shortness of breath   CV:  No pitting edema of BLE.      Vital Signs:  BP (!) 162/70   Pulse 54   Ht 5' 7.25\" (1.708 m)   Wt 172 lb (78 kg)   BMI 26.74 kg/m      Neurological:  Awake  Alert  Oriented x 3  Motor exam:        IP Q DF PF EHL  R   5  5   5   5    5  L   5  5   5   5    5     Able to spontaneously move L/E bilaterally  Sensation intact throughout all L/E dermatomes     Imaging: No recent imaging to review.    A/P: Chronic bilateral low back pain with bilateral sciatica  Since symptoms were not present at last lumbar MRI, would recommend updated lumbar MRI at this time. Will begin PT as well. He would like to have the MRI completed at [facility]. He should contact my office when the MRI is complete and we can retrieve those records. He verbalized understanding and agreement.   Patient Instructions   -Lumbar MRI ordered. We will fax this referral to [facility] for you.   -When your lumbar MRI has been completed, please contact my office so " we can obtain those records.  -Physical therapy ordered. They will contact you to schedule.  -Please contact our clinic with questions or concerns at [phone].    Melissa Dunlap CNP  [address] [address]  Tel [phone]  Fax [phone]      Again, thank you for allowing me to participate in the care of your patient.        Sincerely,        Melissa Dunlap, NP

## 2024-09-24 NOTE — NURSING NOTE
Faxed  MRI order  September 24, 2024 to fax number [phone] ([facility])    Right Fax confirmed at 2:29 PM     Shirley Ledezma RN     Patient updated via [employer].

## 2024-09-24 NOTE — NURSING NOTE
"Kenton Townsend is a 84 year old male who presents for:  Chief Complaint   Patient presents with    Neurologic Problem     LBP. MRI 1/22/24. CT 1/22/24. Onset: 2022. Patient thinks he hurt it while he was taking his scooter out of storage and both he and the scooter fell. He has had back issues since, also weak legs. Pain is across the entire low back and down the legs. He will get some N/T in the legs. No tx.         Vitals:    Vitals:    09/24/24 1301   BP: (!) 162/70   Pulse: 54   Weight: 172 lb (78 kg)   Height: 5' 7.25\" (1.708 m)       BMI:  Estimated body mass index is 26.74 kg/m  as calculated from the following:    Height as of this encounter: 5' 7.25\" (1.708 m).    Weight as of this encounter: 172 lb (78 kg).    Pain Score:  Severe Pain (7)        JARVIS Leonard to follow up with Primary Care provider regarding elevated blood pressure.    "

## 2024-09-24 NOTE — PATIENT INSTRUCTIONS
-Lumbar MRI ordered. We will fax this referral to [facility] for you.   -When your lumbar MRI has been completed, please contact my office so we can obtain those records.  -Physical therapy ordered. They will contact you to schedule.  -Please contact our clinic with questions or concerns at [phone].

## 2024-10-03 ENCOUNTER — MEDICAL CORRESPONDENCE (OUTPATIENT)
Dept: HEALTH INFORMATION MANAGEMENT | Facility: CLINIC | Age: 84
End: 2024-10-03

## 2024-10-04 ENCOUNTER — TELEPHONE (OUTPATIENT)
Dept: NEUROSURGERY | Facility: CLINIC | Age: 84
End: 2024-10-04
Payer: COMMERCIAL

## 2024-10-04 DIAGNOSIS — M54.16 LUMBAR RADICULOPATHY: Primary | ICD-10-CM

## 2024-10-04 NOTE — TELEPHONE ENCOUNTER
Called patient and let him know that once images and report are in our system and available to review, a message will be sent to Provider to do so.

## 2024-10-04 NOTE — TELEPHONE ENCOUNTER
[facility] Call Center    Phone Message    May a detailed message be left on voicemail: no     Reason for Call: Other: Patient is calling to let us know that he had his MRI done today at [facility].      Action Taken: Message routed to:  Other: [facility]    Travel Screening: Not Applicable

## 2024-10-08 NOTE — TELEPHONE ENCOUNTER
CSS states: MRI is now in pacs, report is in care everywhere under op visit on 10/4     Melissa Dunlap NP ordered MRI after OV on 9/24/24. Routed to Melissa Dunlap NP.

## 2024-10-09 NOTE — TELEPHONE ENCOUNTER
Contacted patient to discuss lumbar MRI. He would like to proceed with lumbar injection at this time. He verbalized understanding and agreement.

## 2024-10-14 ENCOUNTER — TELEPHONE (OUTPATIENT)
Dept: PALLIATIVE MEDICINE | Facility: CLINIC | Age: 84
End: 2024-10-14
Payer: COMMERCIAL

## 2024-10-14 DIAGNOSIS — M54.16 LUMBAR RADICULOPATHY: Primary | ICD-10-CM

## 2024-10-14 NOTE — TELEPHONE ENCOUNTER
"Screening Questions for Radiology Injections:    Injection to be done at which interventional clinic site? [facility] [place]    If choosing [facility] for location, please inform patient:  \"[facility] is a Hospital based clinic. Before your visit, you should check with your insurance about how it covers the charges for facility services in a hospital-based clinic.     Procedure ordered by Melissa Dunlap NP in  NEUROSURGERY CLINIC     Procedure ordered? L4-5 SHAUN  Transforaminal Cervical SHAUN - Send to [facility] - No [facility] providers perform this procedure    What insurance would patient like us to bill for this procedure? MEDICA  IF SCHEDULING IN [place] PAIN OR SPINE PLEASE SCHEDULE AT LEAST 7-10 BUSINESS DAYS OUT SO A PA CAN BE OBTAINED  Worker's comp or MVA (motor vehicle accident) -Any injection DO NOT SCHEDULE and route to Eugenia Lawrence.    [employer] insurance - For ALL INJECTIONS DO NOT SCHEDULE and route to Yelena Christian.     ALL BCBS, [employer] and [employer] CIGNA - DO NOT SCHEDULE and route to Yelena Christian  MEDICA- ALL INJECTIONS- route to Yelena Gino    Is patient scheduled at [facility]? NO   If YES, route every encounter to [facility] SPINE CENTER CARE NAVIGATION POOL [[phone]]    Is an  needed? No     Patient has a  home? (Review Grid) YES: Informed    Any chance of pregnancy? Not Applicable   If YES, do NOT schedule and route to RN pool  - Dr. Hassan route to Roxanne Coreas and PM&R Nurse  [[id number]]      Is patient actively being treated for cancer or immunocompromised? No  If YES, do NOT schedule and route to RN pool/ Dr. Hassan's Team    Does the patient have a bleeding or clotting disorder? No   If YES, okay to schedule AND route to RN nurse / Dr. Hassan's Team   (For any patients with platelet count <100, RN must forward to provider)    Is patient taking any Blood Thinners OR Antiplatelet medication?  No   If hold " needed, do NOT schedule, route to RN pool/ Dr. Hassan's Team  Examples:   Blood Thinners: (Coumadin, Warfarin, Jantoven, Pradaxa, Xarelto, Eliquis, Edoxaban, Enoxaparin, Lovenox, Heparin, Arixtra, Fondaparinux or Fragmin)  Antiplatelet Medications: (Plavix, Brilinta or Effient)     Is patient taking any aspirin products (includes Excedrin and Fiorinal)? Yes - Pt takes 81mg daily; instructed to hold 0 day(s) prior to procedure.    If yes route to RN madi/ Dr. Hassan's Team - Do not schedule    Is patient taking any GLP-1 Antagonist (hold needed for sedation patients only) No   (semaglutide (Ozempic, Wegovy), dulaglutide (Trulicity), exenatide ER (Bydureon), tirzepatide (Mounjaro), Liraglutide (Saxenda, Victoza), semaglutide (Rybelsus), Terzepatide (Zepbound)  If YES, okay to schedule AND route to RN nurse / Dr. Hassan's Team      Any allergies to contrast dye, iodine, shellfish, or numbing and steroid medications? No  If YES, schedule and add allergy information to appointment notes AND route to the RN pool/ Dr. Hassan's Team  If SHAUN and Contrast Dye / Iodine Allergy? DO NOT SCHEDULE, route to RN pool/ Dr. Hassan's Team  Allergies: Nkda [no known drug allergy]     Does patient have an active infection or treated for one within the past week? No  Is patient currently taking any antibiotics or steroid medications?  No   For patients on chronic, preventative, or prophylactic antibiotics, procedures may be scheduled.   For patients on antibiotics for active or recent infection, schedule 4 days after completed.  For patients on steroid medications, schedule 4 days after completed.     Has the patient had a flu shot or any other vaccinations within the past 7 days? Yes, Covid booster and flu shot last week  If yes, explain that for the vaccine to work best they need to:     wait 1 week before and 1 week after getting any Vaccine  wait 1 week before and 2 weeks after getting any Covid Vaccine   If patient has concerns about  the timing, send to RN pool/ Dr. Armentas Team    Does patient have an MRI/CT?  YES: 10/04/24 Include Date and Check Procedure Scheduling Grid to see if required.  Was the MRI/CT done within the last 3 years?  Yes   If no route to RN Pool/ Dr. Armentas Team  If yes, where was the MRI/CT done? [facility]   Refer to PACS Transmissions list for approved external locations and route to RN Pool High Priority/ Dr. Barone Team  If MRI was not done at approved external location do NOT schedule and route to RN pool/ Dr. Armentas Team    If patient has an imaging disc, the injection MAY be scheduled but patient must bring disc to appt or appt will be cancelled.    Is patient able to transfer to a procedure table with minimal or no assistance? Yes   If no, do NOT schedule and route to RN Pool/ Dr. Barone Team    Procedure Specific Instructions:  If celiac plexus block, informed patient NPO for 6 hours and that it is okay to take medications with sips of water, especially blood pressure medications Not Applicable       If this is for a cervical procedure, informed patient that aspirin needs to be held for 6 days.   Not Applicable    Sedation, If Sedation is ordered for any procedure, patient must be NPO for 6 hours prior to procedure Not Applicable    If IV needed:  Do not schedule procedures requiring IV placement in the first appointment of the day or first appointment after lunch. Do NOT schedule at 0745, 0815 or 1245.     Instructed patient to arrive 30 minutes early for IV start if required. (Check Procedure Scheduling Grid)  Not Applicable    Reminders:  If you are started on any steroids or antibiotics between now and your appointment, you must contact us because the procedure may need to be cancelled.  Yes    As a reminder, receiving steroids can decrease your body's ability to fight infection.   Would you still like to move forward with scheduling the injection?  Yes    IV Sedation is not provided for procedures. If  oral anti-anxiety medication is needed, the patient should request this from their referring provider.    Instruct patient to arrive as directed prior to the scheduled appointment time:  If IV needed 30 minutes before appointment time     For patients 85 or older we recommend having an adult stay w/ them for the remainder of the day.     If the patient is Diabetic, remind them to bring their glucometer.      Does the patient have any questions?  NO  Fang Kendall  [facility]

## 2024-10-14 NOTE — TELEPHONE ENCOUNTER
Patient is requesting to schedule an injection with the [facility].     This would require the patient to hold:               ASA 81mg/day   Hold 6 days prior to procedure, restart 24 hours after procedure    We are requesting your approval to hold the medication for this time frame.      Routed to primary care provider      Génesis RN-BSN  [facility]   [phone]

## 2024-10-15 NOTE — TELEPHONE ENCOUNTER
Routed to [place] for insurance check.  ASA hold approved.      Génesis, RN-BSN  [facility]   [phone]

## 2024-10-16 NOTE — TELEPHONE ENCOUNTER
PA pending additional information - please specify laterality and if this will be interlaminar or transforminal.          Yelena Summers   [facility]

## 2024-10-17 NOTE — TELEPHONE ENCOUNTER
[employer] only gives 2 week authorization.     Please schedule patient and route back with the date to initiate PA.    ** please make sure its at least 2 weeks out**    Yelena Summers   [facility]

## 2024-10-22 NOTE — TELEPHONE ENCOUNTER
Order ID: [id number]  Authorized      Approval Valid Through:11/05/2024 - 11/18/2024        PATIENT IS SCHEDULED        Routing as OWEN CHATMAN  Complex   [facility]

## 2024-10-25 ENCOUNTER — TELEPHONE (OUTPATIENT)
Dept: FAMILY MEDICINE | Facility: CLINIC | Age: 84
End: 2024-10-25
Payer: COMMERCIAL

## 2024-10-25 ENCOUNTER — MEDICAL CORRESPONDENCE (OUTPATIENT)
Dept: HEALTH INFORMATION MANAGEMENT | Facility: CLINIC | Age: 84
End: 2024-10-25

## 2024-10-25 NOTE — TELEPHONE ENCOUNTER
Home Care is calling regarding an established patient with [facility].       Requesting orders from: Geovanni Vincent  Provider is following patient: No       Orders Requested    Occupational Therapy  Request for initial certification (first set of orders)   Frequency: 1 x in 8 days, then 1 x per week x 1 week       Information was gathered and will be sent to provider for review.  RN will contact Home Care with information after provider review.  Information was gathered and will be sent to provider to confirm provider will be following patient.  RN will contact Home Care with information after provider review.  Confirmed ok to leave a detailed message with call back.  Contact information confirmed and updated as needed.    Pt is scheduled for hospital f/u on 10/31/24.    Carri Cool RN

## 2024-10-25 NOTE — TELEPHONE ENCOUNTER
Attempted to reach OJ Rutledge, from [facility]. There was no answer. Left message on [facility]'s confidential voicemail, relaying provider's approval of the requested orders and okay to return call to a nurse at [phone] with questions.     Occupational Therapy  Request for initial certification (first set of orders)   Frequency: 1 x in 8 days, then 1 x per week x 1 week     YAS Babb, RN

## 2024-10-28 ENCOUNTER — MYC MEDICAL ADVICE (OUTPATIENT)
Dept: FAMILY MEDICINE | Facility: CLINIC | Age: 84
End: 2024-10-28
Payer: COMMERCIAL

## 2024-10-28 ENCOUNTER — TELEPHONE (OUTPATIENT)
Dept: FAMILY MEDICINE | Facility: CLINIC | Age: 84
End: 2024-10-28
Payer: COMMERCIAL

## 2024-10-28 NOTE — TELEPHONE ENCOUNTER
Home Care is calling regarding an established patient with [facility].       Requesting orders from: Geovanni Vincent  Provider is following patient: Yes  Is this a 60-day recertification request?  No    Orders Requested    Physical Therapy  Request for initial certification (first set of orders)   Frequency: 1x in 5 days    2x/wk for 1 wks  then 1x/wk for 5 wks      Information was gathered and will be sent to provider for review.  RN will contact Home Care with information after provider review.  Confirmed ok to leave a detailed message with call back.  Contact information confirmed and updated as needed.    Kristina M Kjellberg, RN

## 2024-10-28 NOTE — TELEPHONE ENCOUNTER
I'll need to look into the discharge summary and what labs to do. Will just order NON-fasating labs at appointment. Geovanni Vincent MD

## 2024-10-28 NOTE — TELEPHONE ENCOUNTER
Please advise if you would like to order labs ahead of time, or just do them at the visit with you on 10/31/24.Gertrude GAFFNEY [facility]

## 2024-10-29 ENCOUNTER — TRANSFERRED RECORDS (OUTPATIENT)
Dept: HEALTH INFORMATION MANAGEMENT | Facility: CLINIC | Age: 84
End: 2024-10-29
Payer: COMMERCIAL

## 2024-10-29 NOTE — TELEPHONE ENCOUNTER
We can decide best plan of labs/wellness exam/follow-up/etc at this weeks appointment. I can cancel/set-up appointments at that time. Geovanni Vincent MD

## 2024-10-29 NOTE — TELEPHONE ENCOUNTER
Patient is scheduled with you on 10/31/24 for a hospital F/U and also on 11/14/24 for a wellness exam. You want him to have a lab appointment on Monday 11/4? Or do labs at appointment on 10/31/24? Do you wan him to keep both hospital F/U and wellness exam, I assume.Gertrude Hardy [facility]

## 2024-10-29 NOTE — TELEPHONE ENCOUNTER
Returned call to SHARON Carty with [facility]. Verbal approval given for requested home care orders, as noted and per Dr. Vincent. No further needs or requests at this time.      Mariella Luz RN  Clinical Triage/Primary Care  [facility]

## 2024-10-31 ENCOUNTER — MYC MEDICAL ADVICE (OUTPATIENT)
Dept: FAMILY MEDICINE | Facility: CLINIC | Age: 84
End: 2024-10-31

## 2024-10-31 ENCOUNTER — OFFICE VISIT (OUTPATIENT)
Dept: FAMILY MEDICINE | Facility: CLINIC | Age: 84
End: 2024-10-31
Payer: COMMERCIAL

## 2024-10-31 ENCOUNTER — MEDICAL CORRESPONDENCE (OUTPATIENT)
Dept: HEALTH INFORMATION MANAGEMENT | Facility: CLINIC | Age: 84
End: 2024-10-31

## 2024-10-31 VITALS
TEMPERATURE: 98.4 F | HEART RATE: 58 BPM | BODY MASS INDEX: 26.07 KG/M2 | OXYGEN SATURATION: 99 % | SYSTOLIC BLOOD PRESSURE: 134 MMHG | DIASTOLIC BLOOD PRESSURE: 70 MMHG | WEIGHT: 172 LBS | HEIGHT: 68 IN | RESPIRATION RATE: 20 BRPM

## 2024-10-31 DIAGNOSIS — D61.818 PANCYTOPENIA (H): Primary | ICD-10-CM

## 2024-10-31 DIAGNOSIS — N18.30 STAGE 3 CHRONIC KIDNEY DISEASE, UNSPECIFIED WHETHER STAGE 3A OR 3B CKD (H): ICD-10-CM

## 2024-10-31 DIAGNOSIS — M54.16 LUMBAR RADICULOPATHY: ICD-10-CM

## 2024-10-31 PROCEDURE — 85027 COMPLETE CBC AUTOMATED: CPT | Performed by: FAMILY MEDICINE

## 2024-10-31 PROCEDURE — 80069 RENAL FUNCTION PANEL: CPT | Performed by: FAMILY MEDICINE

## 2024-10-31 PROCEDURE — 99214 OFFICE O/P EST MOD 30 MIN: CPT | Performed by: FAMILY MEDICINE

## 2024-10-31 PROCEDURE — 36415 COLL VENOUS BLD VENIPUNCTURE: CPT | Performed by: FAMILY MEDICINE

## 2024-10-31 RX ORDER — BICALUTAMIDE 50 MG/1
TABLET, FILM COATED ORAL
COMMUNITY
Start: 2024-10-25 | End: 2024-11-01

## 2024-10-31 RX ORDER — TIZANIDINE 2 MG/1
2 TABLET ORAL 2 TIMES DAILY PRN
Qty: 60 TABLET | Refills: 2 | Status: SHIPPED | OUTPATIENT
Start: 2024-10-31

## 2024-10-31 RX ORDER — PEGFILGRASTIM-APGF 6 MG/.6ML
6 INJECTION, SOLUTION SUBCUTANEOUS ONCE
COMMUNITY
Start: 2024-10-31

## 2024-10-31 RX ORDER — MULTIVITAMIN
1 TABLET ORAL DAILY
COMMUNITY

## 2024-10-31 RX ORDER — METOPROLOL TARTRATE 25 MG/1
TABLET, FILM COATED ORAL
COMMUNITY
Start: 2024-10-24

## 2024-10-31 NOTE — PROGRESS NOTES
ASSESSMENT / PLAN:  (D61.818) Pancytopenia (H)  (primary encounter diagnosis)  Comment: unclear etiology. No more fevers and feelling overall better  Plan: CBC with platelets, Renal panel        Seeing hematology and p.t./o.t.. good cares at home.       (N18.30) Stage 3 chronic kidney disease, unspecified whether stage 3a or 3b CKD (H)  Comment: up/down in past  Plan: CBC with platelets, Renal panel        Nephrology if a lot worse. Continue monitor blood pressure.   Seeing for wellness exam in 2 weeks. More water.     (M54.16) Lumbar radiculopathy  Comment: needs help  Plan: tiZANidine (ZANAFLEX) 2 MG tablet        Reveiwed risks and side effects of medication  Seeing pain specialist next week. Continue tylenol prn/p.t.      Adeola Fung is a 84 year old, presenting for the following health issues:  Hospital F/U (Fractured left hip and aortic aneurysm.. )  Follow-up hospitalization for fevers/back pain and leg weakness/fall.  Seeing hematology and ID follow-up for fevers and pancytopenia.   Metoprolol added and hydralazine dosage increased. Gien sodium bicarbonate and started on bicalutamide per hematology.   Needs repeat cbc/bmp. AAA worse- observation - no surgery recommended.   History anemia, CRI and htn.  No fevers. No nausea, vomiting or diarrhea or black/bloody stools. No urine changes or hematuria.   No abdominal pain. Still some back pain. Fliuds ok. Breathing ok.   No salt pills. No outside blood pressure. No chest pain.   Tylenol prn pan.  Seeing O.T and P.T. still some weakness.   Lives with son. 6 kids.   Emotionally doing ok. Has living will.       10/31/2024     1:30 PM   Additional Questions   Roomed by Migdalia DUMAS         Hospital Follow-up Visit:    Hospital/Nursing Home/IP Rehab Facility:  [facility]   Date of Admission: 10/17/2024  Date of Discharge: 10/24/2024  Reason(s) for Admission: Fractured left hip and aortic anuerym  Was the patient in the ICU or did the patient  "experience delirium during hospitalization?  No  Do you have any other stressors you would like to discuss with your provider? No    Problems taking medications regularly:  no  Medication changes since discharge: see above  Problems adhering to non-medication therapy:  None    Summary of hospitalization:  CareEverywhere information obtained and reviewed  Diagnostic Tests/Treatments reviewed.  Follow up needed: see above  Other Healthcare Providers Involved in Patient s Care:         Specialist appointment - hematology and Physical Therapy  Update since discharge: stable.         Plan of care communicated with patient and family                       Objective    /70   Pulse 58   Temp 98.4  F (36.9  C) (Tympanic)   Resp 20   Ht 1.715 m (5' 7.5\")   Wt 78 kg (172 lb)   SpO2 99%   BMI 26.54 kg/m      Physical Exam   GENERAL: alert and no distress  EYES: Eyes grossly normal to inspection, PERRL and conjunctivae and sclerae normal  HENT: ear canals and TM's normal, nose and mouth without ulcers or lesions  NECK: no adenopathy, no asymmetry, masses, or scars  RESP: lungs clear to auscultation - no rales, rhonchi or wheezes  CV: regular rate and rhythm, normal S1 S2, no S3 or S4, no murmur, click or rub, no peripheral edema   ABDOMEN: soft, nontender, no hepatosplenomegaly, no masses and bowel sounds normal  MS: no gross musculoskeletal defects noted, no edema  MS: diffusely tight lower lumbar muscles.  SKIN: no suspicious lesions or rashes  NEURO: Normal strength and tone, mentation intact and speech normal  PSYCH: mentation appears normal, affect normal/bright            Signed Electronically by: Geovanni Vincent MD    "

## 2024-11-01 LAB
ALBUMIN SERPL BCG-MCNC: 3.7 G/DL (ref 3.5–5.2)
ANION GAP SERPL CALCULATED.3IONS-SCNC: 11 MMOL/L (ref 7–15)
BUN SERPL-MCNC: 30.5 MG/DL (ref 8–23)
CALCIUM SERPL-MCNC: 9 MG/DL (ref 8.8–10.4)
CHLORIDE SERPL-SCNC: 105 MMOL/L (ref 98–107)
CREAT SERPL-MCNC: 1.8 MG/DL (ref 0.67–1.17)
DACRYOCYTES BLD QL SMEAR: SLIGHT
EGFRCR SERPLBLD CKD-EPI 2021: 36 ML/MIN/1.73M2
ELLIPTOCYTES BLD QL SMEAR: SLIGHT
ERYTHROCYTE [DISTWIDTH] IN BLOOD BY AUTOMATED COUNT: 17.2 % (ref 10–15)
GLUCOSE SERPL-MCNC: 104 MG/DL (ref 70–99)
HCO3 SERPL-SCNC: 21 MMOL/L (ref 22–29)
HCT VFR BLD AUTO: 30 % (ref 40–53)
HGB BLD-MCNC: 9.4 G/DL (ref 13.3–17.7)
MCH RBC QN AUTO: 30 PG (ref 26.5–33)
MCHC RBC AUTO-ENTMCNC: 31.3 G/DL (ref 31.5–36.5)
MCV RBC AUTO: 96 FL (ref 78–100)
PHOSPHATE SERPL-MCNC: 3.4 MG/DL (ref 2.5–4.5)
PLAT MORPH BLD: ABNORMAL
PLATELET # BLD AUTO: 144 10E3/UL (ref 150–450)
POTASSIUM SERPL-SCNC: 4.7 MMOL/L (ref 3.4–5.3)
RBC # BLD AUTO: 3.13 10E6/UL (ref 4.4–5.9)
RBC MORPH BLD: ABNORMAL
ROULEAUX BLD QL SMEAR: PRESENT
SODIUM SERPL-SCNC: 137 MMOL/L (ref 135–145)
VARIANT LYMPHS BLD QL SMEAR: PRESENT
WBC # BLD AUTO: 1.1 10E3/UL (ref 4–11)

## 2024-11-04 NOTE — TELEPHONE ENCOUNTER
Pt may not have been advised to hold ASA.  Dr. Rodriguez reviewed and may change to transfaminal approach.  Keli reviewed and this is ok.    Génesis RN-BSN  [facility]   [phone]

## 2024-11-05 ENCOUNTER — MEDICAL CORRESPONDENCE (OUTPATIENT)
Dept: HEALTH INFORMATION MANAGEMENT | Facility: CLINIC | Age: 84
End: 2024-11-05

## 2024-11-05 ENCOUNTER — RADIOLOGY INJECTION OFFICE VISIT (OUTPATIENT)
Dept: PALLIATIVE MEDICINE | Facility: CLINIC | Age: 84
End: 2024-11-05
Attending: NURSE PRACTITIONER
Payer: COMMERCIAL

## 2024-11-05 VITALS — DIASTOLIC BLOOD PRESSURE: 72 MMHG | SYSTOLIC BLOOD PRESSURE: 154 MMHG | HEART RATE: 64 BPM

## 2024-11-05 DIAGNOSIS — M54.16 LUMBAR RADICULOPATHY: ICD-10-CM

## 2024-11-05 PROCEDURE — 64483 NJX AA&/STRD TFRM EPI L/S 1: CPT | Mod: 50 | Performed by: PAIN MEDICINE

## 2024-11-05 RX ADMIN — DEXAMETHASONE SODIUM PHOSPHATE 10 MG: 10 INJECTION, SOLUTION INTRAMUSCULAR; INTRAVENOUS at 20:31

## 2024-11-05 ASSESSMENT — PAIN SCALES - GENERAL
PAINLEVEL_OUTOF10: MODERATE PAIN (5)
PAINLEVEL_OUTOF10: SEVERE PAIN (7)

## 2024-11-05 NOTE — PATIENT INSTRUCTIONS
[facility]   Procedure Discharge Instructions    Today you saw:  Dr. Bob Rodriguez     You had an:  Epidural steroid injection       If you were holding your blood thinning medication, please restart taking it: Restart aspirin in 24 hours  Be cautious when walking. Numbness and/or weakness in the lower extremities may occur for up to 6-8 hours after the procedure due to effect of the local anesthetic  Do not drive for 6 hours. The effect of the local anesthetic could slow your reflexes.   You may resume your regular activities after 24 hours  Avoid strenuous activity for the first 24 hours  You may shower, however avoid swimming, tub baths or hot tubs for 24 hours following your procedure  You may have a mild to moderate increase in pain for several days following the injection.  It may take up to 14 days for the steroid medication to start working although you may feel the effect as early as a few days after the procedure.     You may use ice packs for 10-15 minutes, 3 to 4 times a day at the injection site for comfort  Do not use heat to painful areas for 6 to 8 hours. This will give the local anesthetic time to wear off and prevent you from accidentally burning your skin.   Unless you have been directed to avoid the use of anti-inflammatory medications (NSAIDS), you may use medications such as ibuprofen, Aleve or Tylenol for pain control if needed.   If you have diabetes, check your blood sugar more frequently than usual as your blood sugar may be higher than normal for 10-14 days following a steroid injection. Contact your doctor who manages your diabetes if your blood sugar is higher than usual  Possible side effects of steroids that you may experience include flushing, elevated blood pressure, increased appetite, mild headaches and restlessness.  All of these symptoms will get better with time.  If you experience any of the following, call the Pain Clinic during work hours  (Mon-Friday 8-4:30 pm) at [phone] or the Provider Line after hours at [phone]:  -Fever over 100 degree F  -Swelling, bleeding, redness, drainage, warmth at the injection site  -Progressive weakness or numbness in your legs  -Loss of bowel or bladder function  -Unusual headache that is not relieved by Tylenol or other pain reliever  -Unusual new onset of pain that is not improving

## 2024-11-05 NOTE — NURSING NOTE
Pre-procedure Intake  If YES to any questions or NO to having a   Please complete laminated checklist and leave on the computer keyboard for Provider, verbally inform provider if able.    For SCS Trial, RFA's or any sedation procedure:  Have you been fasting? NA  If yes, for how long?    Are you taking any any blood thinners such as Coumadin, Warfarin, Jantoven, Pradaxa Xarelto, Eliquis, Edoxaban, Enoxaparin, Lovenox, Heparin, Arixtra, Fondaparinux, or Fragmin? OR Antiplatelet medication such as Plavix, Brilinta, or Effient?   No   If yes, when did you take your last dose?     Do you take aspirin?  Yes   If cervical procedure, have you held aspirin for 6 days?   No. Not sure when he took asprin     Is the Pt taking any GLP-1 Antagonist (hold needed for sedation patients only)  (semaglutide (Ozempic, Wegovy), dulaglutide (Trulicity), exenatide ER (Bydureon), tirzepatide (Mounjaro), Liraglutide (Saxenda, Victoza), semaglutide (Rybelsus)     NA  If yes, when did you take your last dose?     Do you have any allergies to contrast dye, iodine, steroid and/or numbing medications?  NO    Are you currently taking antibiotics or have an active infection?  NO    Have you had a fever/elevated temperature within the past week? NO    Are you currently taking oral steroids? NO    Do you have a ? Yes    Are you pregnant or breastfeeding?  Not Applicable    Have you received any vaccinations in the last week? NO    Notify provider and RNs if systolic BP >170, diastolic BP >100, P >100 or O2 sats < 90%

## 2024-11-05 NOTE — NURSING NOTE
Discharge Information    IV Discontiued Time:  NA    Amount of Fluid Infused:  NA    Discharge Criteria = When patient returns to baseline or as per MD order    Consciousness:  Pt is fully awake    Circulation:  BP +/- 20% of pre-procedure level    Respiration:  Patient is able to breathe deeply    O2 Sat:  Patient is able to maintain O2 Sat >92% on room air    Activity:  Moves 4 extremities on command    Ambulation:  Patient is able to stand and walk or stand and pivot into wheelchair    Dressing:  Clean/dry or No Dressing    Notes:   Discharge instructions and AVS given to patient    Patient meets criteria for discharge?  YES    Admitted to PCU?  No    Responsible adult present to accompany patient home?  Yes    Signature/Title:    Catalina Parham RN  RN Care Coordinator  [facility]

## 2024-11-05 NOTE — PROGRESS NOTES
Pre procedure Diagnosis: lumbar radiculopathy, lumbar degenerative disc disease   Post procedure Diagnosis: Same  Procedure performed: lumbar transforaminal epidural steroid injection at bilateral L4-5, fluoroscopically guided, contrast controlled  Anesthesia: none  Complications: none  Operators: Bob Rodriguez MD     Indications:   Kenton Townsend is a 84 year old male.  They have a history of bilateral LBP Rad to his le.  Exam shows + slump and they have tried conservative treatment including meds/pt.    MRI rev  Options/alternatives, benefits and risks were discussed with the patient including bleeding, infection, tissue trauma, numbness, weakness, paralysis, spinal cord injury, radiation exposure, headache and reaction to medications. Questions were answered to his satisfaction and he agrees to proceed. Voluntary informed consent was obtained and signed.     Vitals were reviewed: Yes  BP (!) 154/72   Pulse 64   Allergies were reviewed:  Yes   Medications were reviewed:  Yes   Pre-procedure pain score: 7/10    Procedure:  After getting informed consent, patient was brought into the procedure suite and was placed in a prone position on the procedure table.   A Pause for the Cause was performed.  Patient was prepped and draped in sterile fashion.     After identifying the bilateral L4-5 neuroforamen, the C-arm was rotated to a bilateral lateral oblique angle.  A total of 3ml of Lidocaine 1% was used to anesthetize the skin and the needle track at a skin entry site coaxial with the fluoroscopy beam, and overriding the superior aspect of the neuroforamen.  A 22 gauge 3.5 inch spinal needle was advanced under intermittent fluoroscopy until it entered the foramen superiorly.    The position was then inspected from anteroposterior and lateral views, and the needle adjusted appropriately.  A total of 1ml of Omnipaque-300 was injected, confirming appropriate position, with spread into the nerve root sheath and the  epidural space, with no intravascular uptake. 9ml was wasted    Then, after repeated negative aspiration, a combination of Decadron 10 mg, 0.25% bupivacaine 2 ml, diluted with 3ml of normal saline was injected.     Hemostasis was achieved, the area was cleaned, and bandaids were placed when appropriate.  The patient tolerated the procedure well, and was taken to the recovery room.    Images were saved to PACS.    Post-procedure pain score: 5/10  Follow-up includes:   -f/u with referring provider    Bob Rodriguez MD  [facility]    bi

## 2024-11-11 RX ORDER — DEXAMETHASONE SODIUM PHOSPHATE 10 MG/ML
10 INJECTION, SOLUTION INTRAMUSCULAR; INTRAVENOUS ONCE
Status: COMPLETED | OUTPATIENT
Start: 2024-11-05 | End: 2024-11-05

## 2024-11-12 ENCOUNTER — MEDICAL CORRESPONDENCE (OUTPATIENT)
Dept: HEALTH INFORMATION MANAGEMENT | Facility: CLINIC | Age: 84
End: 2024-11-12

## 2024-11-14 ENCOUNTER — OFFICE VISIT (OUTPATIENT)
Dept: FAMILY MEDICINE | Facility: CLINIC | Age: 84
End: 2024-11-14
Payer: COMMERCIAL

## 2024-11-14 VITALS
OXYGEN SATURATION: 98 % | WEIGHT: 172 LBS | RESPIRATION RATE: 22 BRPM | BODY MASS INDEX: 26.07 KG/M2 | TEMPERATURE: 98 F | HEART RATE: 66 BPM | HEIGHT: 68 IN | DIASTOLIC BLOOD PRESSURE: 70 MMHG | SYSTOLIC BLOOD PRESSURE: 137 MMHG

## 2024-11-14 DIAGNOSIS — N18.30 STAGE 3 CHRONIC KIDNEY DISEASE, UNSPECIFIED WHETHER STAGE 3A OR 3B CKD (H): ICD-10-CM

## 2024-11-14 DIAGNOSIS — I10 ESSENTIAL HYPERTENSION WITH GOAL BLOOD PRESSURE LESS THAN 140/90: ICD-10-CM

## 2024-11-14 DIAGNOSIS — Z00.00 ENCOUNTER FOR MEDICARE ANNUAL WELLNESS EXAM: Primary | ICD-10-CM

## 2024-11-14 DIAGNOSIS — M06.9 RHEUMATOID ARTHRITIS, INVOLVING UNSPECIFIED SITE, UNSPECIFIED WHETHER RHEUMATOID FACTOR PRESENT (H): ICD-10-CM

## 2024-11-14 PROCEDURE — G0438 PPPS, INITIAL VISIT: HCPCS | Performed by: FAMILY MEDICINE

## 2024-11-14 SDOH — HEALTH STABILITY: PHYSICAL HEALTH: ON AVERAGE, HOW MANY DAYS PER WEEK DO YOU ENGAGE IN MODERATE TO STRENUOUS EXERCISE (LIKE A BRISK WALK)?: 0 DAYS

## 2024-11-14 SDOH — HEALTH STABILITY: PHYSICAL HEALTH: ON AVERAGE, HOW MANY MINUTES DO YOU ENGAGE IN EXERCISE AT THIS LEVEL?: 0 MIN

## 2024-11-14 ASSESSMENT — SOCIAL DETERMINANTS OF HEALTH (SDOH): HOW OFTEN DO YOU GET TOGETHER WITH FRIENDS OR RELATIVES?: MORE THAN THREE TIMES A WEEK

## 2024-11-14 ASSESSMENT — PAIN SCALES - GENERAL: PAINLEVEL_OUTOF10: NO PAIN (1)

## 2024-11-14 NOTE — PROGRESS NOTES
Preventive Care Visit  [facility]  Geovanni Vincent MD, Family Medicine  Nov 14, 2024      ASSESSMENT / PLAN:  (Z00.00) Encounter for Medicare annual wellness exam  (primary encounter diagnosis)  Comment: generally healthy and normal exam. Memory ok and good support  Plan: continue exercise/balanced diet    (N18.30) CKD (chronic kidney disease) stage 3, GFR 30-59 ml/min (H)  Comment: imporving  Plan: Recheck in 6 months  Sefl-monitor blood pressure. More water/less cafffeine. Chest pain or shortness of breath to er. Return to clinic if worse    (I10) Essential hypertension with goal blood pressure less than 140/90    Plan: see above    (M06.9) Rheumatoid arthritis, involving unspecified site, unspecified whether rheumatoid factor present (H)  Comment: stable  Plan: tylenol prn. Exercise.       Adeola Fung is a 84 year old, presenting for the following:  Wellness Visit  Follow-up anemnia, CRI, lumbar radiculopathy and htn. Pancytopenia.  Cr improving 2 weeks ago.   2007- no dating. Lives with son. 5 kids. 13 grandkids  [place] blood pressure readings ok. No chest pain or shortness of breath.   Water intake ok. Coffee 3 cups/day.   No nausea, vomiting or diarrhea or black/bloody stools. No urine changes or hematuria.   Emotionally doing ok. Sleep overall ok but insomnia.   No recent falls. Using cane and walker.   Memory ok.   Likes milk. Eats meat and eggs.         11/14/2024    11:18 AM   Additional Questions   Roomed by Migdalia   Accompanied by self         11/14/2024    11:18 AM   Patient Reported Additional Medications   Patient reports taking the following new medications n/a           HPI    Health Care Directive  Patient does not have a Health Care Directive: Discussed advance care planning with patient; however, patient declined at this time.      11/14/2024   General Health   How would you rate your overall physical health? (!) DECLINE   Feel stress (tense, anxious, or  unable to sleep) Not at all            11/14/2024   Nutrition   Diet: Regular (no restrictions)            11/14/2024   Exercise   Days per week of moderate/strenous exercise 0 days   Average minutes spent exercising at this level 0 min      (!) EXERCISE CONCERN      11/14/2024   Social Factors   Frequency of gathering with friends or relatives More than three times a week   Worry food won't last until get money to buy more No   Food not last or not have enough money for food? No   Do you have housing? (Housing is defined as stable permanent housing and does not include staying ouside in a car, in a tent, in an abandoned building, in an overnight shelter, or couch-surfing.) Yes   Are you worried about losing your housing? No   Lack of transportation? No   Unable to get utilities (heat,electricity)? No            11/14/2024   Fall Risk   Fallen 2 or more times in the past year? No    Trouble with walking or balance? No        Patient-reported          11/14/2024   Activities of Daily Living- Home Safety   Needs help with the following daily activites Transportation    Shopping    Preparing meals    Housework   Safety concerns in the home Throw rugs in the hallway    No grab bars in the bathroom    No handrails on the stairs       Multiple values from one day are sorted in reverse-chronological order         11/14/2024   Dental   Dentist two times every year? (!) NO            11/14/2024   Hearing Screening   Hearing concerns? (!) I FEEL THAT PEOPLE ARE MUMBLING OR NOT SPEAKING CLEARLY.            11/14/2024   Driving Risk Screening   Patient/family members have concerns about driving (!) YES             11/14/2024   General Alertness/Fatigue Screening   Have you been more tired than usual lately? (!) YES            11/14/2024   Urinary Incontinence Screening   Bothered by leaking urine in past 6 months No            11/14/2024   TB Screening   Were you born outside of the [place]? No            Today's PHQ-2 Score:        2024    11:17 AM   PHQ-2 (  [employer])   PHQ-2 Score Incomplete           2024   Substance Use   Alcohol more than 3/day or more than 7/wk No   Do you have a current opioid prescription? No   How severe/bad is pain from 1 to 10? 0/10 (No Pain)   Do you use any other substances recreationally? No        Social History     Tobacco Use    Smoking status: Former     Current packs/day: 0.00     Types: Cigarettes     Quit date: 1979     Years since quittin.0    Smokeless tobacco: Never   Vaping Use    Vaping status: Never Used   Substance Use Topics    Alcohol use: Yes     Comment: couple times a week    Drug use: No                 Reviewed and updated as needed this visit by Provider                      Current providers sharing in care for this patient include:  Patient Care Team:  Geovanni Vincent MD as PCP - General (Family Practice)  Geovanni Vincent MD as Assigned PCP  David Velazco DO as MD (Nephrology)  Melissa Dunlap NP as Assigned Neuroscience Provider    The following health maintenance items are reviewed in Epic and correct as of today:  Health Maintenance   Topic Date Due    ANNUAL REVIEW OF HM ORDERS  Never done    RSV VACCINE (1 - 1-dose 75+ series) Never done    COLORECTAL CANCER SCREENING  2017    DTAP/TDAP/TD IMMUNIZATION (2 - Td or Tdap) 2022    ADVANCE CARE PLANNING  10/19/2022    MICROALBUMIN  2023    COVID-19 Vaccine ( season) 2024    LIPID  2025    BMP  10/31/2025    HEMOGLOBIN  10/31/2025    MEDICARE ANNUAL WELLNESS VISIT  2025    FALL RISK ASSESSMENT  2025    PHQ-2 (once per calendar year)  Completed    INFLUENZA VACCINE  Completed    Pneumococcal Vaccine: 65+ Years  Completed    URINALYSIS  Completed    ZOSTER IMMUNIZATION  Completed    HPV IMMUNIZATION  Aged Out    MENINGITIS IMMUNIZATION  Aged Out    RSV MONOCLONAL ANTIBODY  Aged Out            Objective    Exam  /70   Pulse 66   Temp  "98  F (36.7  C) (Tympanic)   Resp 22   Ht 1.715 m (5' 7.5\")   Wt 78 kg (172 lb)   SpO2 98%   BMI 26.54 kg/m        Physical Exam  GENERAL: alert and no distress  EYES: Eyes grossly normal to inspection, PERRL and conjunctivae and sclerae normal  HENT: ear canals and TM's normal, nose and mouth without ulcers or lesions  NECK: no adenopathy, no asymmetry, masses, or scars  RESP: lungs clear to auscultation - no rales, rhonchi or wheezes  BREAST: normal without masses, tenderness or nipple discharge and no palpable axillary masses or adenopathy  CV: regular rate and rhythm, normal S1 S2, no S3 or S4, no murmur, click or rub, no peripheral edema   ABDOMEN: soft, nontender, no hepatosplenomegaly, no masses and bowel sounds normal  MS: no gross musculoskeletal defects noted, no edema  SKIN: no suspicious lesions or rashes  NEURO: Normal strength and tone, mentation intact and speech normal  PSYCH: mentation appears normal, affect normal/bright        11/14/2024   Mini Cog   Mini-Cog Not Completed (choose reason) Patient declines            Normal cognition based on my direct observation during interview and exam.             Signed Electronically by: Geovanni Vincent MD    "

## 2024-11-14 NOTE — PATIENT INSTRUCTIONS
Patient Education   Preventive Care Advice   This is general advice given by our system to help you stay healthy. However, your care team may have specific advice just for you. Please talk to your care team about your preventive care needs.  Nutrition  Eat 5 or more servings of fruits and vegetables each day.  Try wheat bread, brown rice and whole grain pasta (instead of white bread, rice, and pasta).  Get enough calcium and vitamin D. Check the label on foods and aim for 100% of the RDA (recommended daily allowance).  Lifestyle  Exercise at least 150 minutes each week  (30 minutes a day, 5 days a week).  Do muscle strengthening activities 2 days a week. These help control your weight and prevent disease.  No smoking.  Wear sunscreen to prevent skin cancer.  Have a dental exam and cleaning every 6 months.  Yearly exams  See your health care team every year to talk about:  Any changes in your health.  Any medicines your care team has prescribed.  Preventive care, family planning, and ways to prevent chronic diseases.  Shots (vaccines)   HPV shots (up to age 26), if you've never had them before.  Hepatitis B shots (up to age 59), if you've never had them before.  COVID-19 shot: Get this shot when it's due.  Flu shot: Get a flu shot every year.  Tetanus shot: Get a tetanus shot every 10 years.  Pneumococcal, hepatitis A, and RSV shots: Ask your care team if you need these based on your risk.  Shingles shot (for age 50 and up)  General health tests  Diabetes screening:  Starting at age 35, Get screened for diabetes at least every 3 years.  If you are younger than age 35, ask your care team if you should be screened for diabetes.  Cholesterol test: At age 39, start having a cholesterol test every 5 years, or more often if advised.  Bone density scan (DEXA): At age 50, ask your care team if you should have this scan for osteoporosis (brittle bones).  Hepatitis C: Get tested at least once in your life.  STIs (sexually  transmitted infections)  Before age 24: Ask your care team if you should be screened for STIs.  After age 24: Get screened for STIs if you're at risk. You are at risk for STIs (including HIV) if:  You are sexually active with more than one person.  You don't use condoms every time.  You or a partner was diagnosed with a sexually transmitted infection.  If you are at risk for HIV, ask about PrEP medicine to prevent HIV.  Get tested for HIV at least once in your life, whether you are at risk for HIV or not.  Cancer screening tests  Cervical cancer screening: If you have a cervix, begin getting regular cervical cancer screening tests starting at age 21.  Breast cancer scan (mammogram): If you've ever had breasts, begin having regular mammograms starting at age 40. This is a scan to check for breast cancer.  Colon cancer screening: It is important to start screening for colon cancer at age 45.  Have a colonoscopy test every 10 years (or more often if you're at risk) Or, ask your provider about stool tests like a FIT test every year or Cologuard test every 3 years.  To learn more about your testing options, visit:   .  For help making a decision, visit:   https://bit.ly/mk11542.  Prostate cancer screening test: If you have a prostate, ask your care team if a prostate cancer screening test (PSA) at age 55 is right for you.  Lung cancer screening: If you are a current or former smoker ages 50 to 80, ask your care team if ongoing lung cancer screenings are right for you.  For informational purposes only. Not to replace the advice of your health care provider. Copyright   2023 [facility]. All rights reserved. Clinically reviewed by the [facility]. [employer] 361493 - REV 01/24.  Learning About Activities of Daily Living  What are activities of daily living?     Activities of daily living (ADLs) are the basic self-care tasks you do every day. These include eating, bathing, dressing,  and moving around.  As you age, and if you have health problems, you may find that it's harder to do some of these tasks. If so, your doctor can suggest ideas that may help.  To measure what kind of help you may need, your doctor will ask how well you are able to do ADLs. Let your doctor know if there are any tasks that you are having trouble doing. This is an important first step to getting help. And when you have the help you need, you can stay as independent as possible.  How will a doctor assess your ADLs?  Asking about ADLs is part of a routine health checkup your doctor will likely do as you age. Your health check might be done in a doctor's office, in your home, or at a hospital. The goal is to find out if you are having any problems that could make it hard to care for yourself or that make it unsafe for you to be on your own.  To measure your ADLs, your doctor will ask how hard it is for you to do routine tasks. Your doctor may also want to know if you have changed the way you do a task because of a health problem. Your doctor may watch how you:  Walk back and forth.  Keep your balance while you stand or walk.  Move from sitting to standing or from a bed to a chair.  Button or unbutton a shirt or sweater.  Remove and put on your shoes.  It's common to feel a little worried or anxious if you find you can't do all the things you used to be able to do. Talking with your doctor about ADLs is a way to make sure you're as safe as possible and able to care for yourself as well as you can. You may want to bring a caregiver, friend, or family member to your checkup. They can help you talk to your doctor.  Follow-up care is a key part of your treatment and safety. Be sure to make and go to all appointments, and call your doctor if you are having problems. It's also a good idea to know your test results and keep a list of the medicines you take.  Current as of: October 24, 2023  Content Version: 14.2 2024 [facility]  [employer].   Care instructions adapted under license by your healthcare professional. If you have questions about a medical condition or this instruction, always ask your healthcare professional. [employer] disclaims any warranty or liability for your use of this information.    Hearing Loss: Care Instructions  Overview     Hearing loss is a sudden or slow decrease in how well you hear. It can range from slight to profound. Permanent hearing loss can occur with aging. It also can happen when you are exposed long-term to loud noise. Examples include listening to loud music, riding motorcycles, or being around other loud machines.  Hearing loss can affect your work and home life. It can make you feel lonely or depressed. You may feel that you have lost your independence. But hearing aids and other devices can help you hear better and feel connected to others.  Follow-up care is a key part of your treatment and safety. Be sure to make and go to all appointments, and call your doctor if you are having problems. It's also a good idea to know your test results and keep a list of the medicines you take.  How can you care for yourself at home?  Avoid loud noises whenever possible. This helps keep your hearing from getting worse.  Always wear hearing protection around loud noises.  Wear a hearing aid as directed.  A professional can help you pick a hearing aid that will work best for you.  You can also get hearing aids over the counter for mild to moderate hearing loss.  Have hearing tests as your doctor suggests. They can show whether your hearing has changed. Your hearing aid may need to be adjusted.  Use other devices as needed. These may include:  Telephone amplifiers and hearing aids that can connect to a television, stereo, radio, or microphone.  Devices that use lights or vibrations. These alert you to the doorbell, a ringing telephone, or a baby monitor.  Television closed-captioning. This shows  "the words at the bottom of the screen. Most new TVs can do this.  TTY (text telephone). This lets you type messages back and forth on the telephone instead of talking or listening. These devices are also called TDD. When messages are typed on the keyboard, they are sent over the phone line to a receiving TTY. The message is shown on a monitor.  Use text messaging, social media, and email if it is hard for you to communicate by telephone.  Try to learn a listening technique called speechreading. It is not lipreading. You pay attention to people's gestures, expressions, posture, and tone of voice. These clues can help you understand what a person is saying. Face the person you are talking to, and have them face you. Make sure the lighting is good. You need to see the other person's face clearly.  Think about counseling if you need help to adjust to your hearing loss.  When should you call for help?  Watch closely for changes in your health, and be sure to contact your doctor if:    You think your hearing is getting worse.     You have new symptoms, such as dizziness or nausea.   Where can you learn more?  Go to https://www.[employer].net/patiented  Enter R798 in the search box to learn more about \"Hearing Loss: Care Instructions.\"  Current as of: September 27, 2023  Content Version: 14.2 2024 [employer].   Care instructions adapted under license by your healthcare professional. If you have questions about a medical condition or this instruction, always ask your healthcare professional. [employer] disclaims any warranty or liability for your use of this information.    Learning About Sleeping Well  What does sleeping well mean?     Sleeping well means getting enough sleep to feel good and stay healthy. How much sleep is enough varies among people.  The number of hours you sleep and how you feel when you wake up are both important. If you do not feel refreshed, you probably need more sleep. " "Another sign of not getting enough sleep is feeling tired during the day.  Experts recommend that adults get at least 7 or more hours of sleep per day. Children and older adults need more sleep.  Why is getting enough sleep important?  Getting enough quality sleep is a basic part of good health. When your sleep suffers, your physical health, mood, and your thoughts can suffer too. You may find yourself feeling more grumpy or stressed. Not getting enough sleep also can lead to serious problems, including injury, accidents, anxiety, and depression.  What might cause poor sleeping?  Many things can cause sleep problems, including:  Changes to your sleep schedule.  Stress. Stress can be caused by fear about a single event, such as giving a speech. Or you may have ongoing stress, such as worry about work or school.  Depression, anxiety, and other mental or emotional conditions.  Changes in your sleep habits or surroundings. This includes changes that happen where you sleep, such as noise, light, or sleeping in a different bed. It also includes changes in your sleep pattern, such as having jet lag or working a late shift.  Health problems, such as pain, breathing problems, and restless legs syndrome.  Lack of regular exercise.  Using alcohol, nicotine, or caffeine before bed.  How can you help yourself?  Here are some tips that may help you sleep more soundly and wake up feeling more refreshed.  Your sleeping area   Use your bedroom only for sleeping and sex. A bit of light reading may help you fall asleep. But if it doesn't, do your reading elsewhere in the house. Try not to use your TV, computer, smartphone, or tablet while you are in bed.  Be sure your bed is big enough to stretch out comfortably, especially if you have a sleep partner.  Keep your bedroom quiet, dark, and cool. Use curtains, blinds, or a sleep mask to block out light. To block out noise, use earplugs, soothing music, or a \"white noise\" machine.  Your " "evening and bedtime routine   Create a relaxing bedtime routine. You might want to take a warm shower or bath, or listen to soothing music.  Go to bed at the same time every night. And get up at the same time every morning, even if you feel tired.  What to avoid   Limit caffeine (coffee, tea, caffeinated sodas) during the day, and don't have any for at least 6 hours before bedtime.  Avoid drinking alcohol before bedtime. Alcohol can cause you to wake up more often during the night.  Try not to smoke or use tobacco, especially in the evening. Nicotine can keep you awake.  Limit naps during the day, especially close to bedtime.  Avoid lying in bed awake for too long. If you can't fall asleep or if you wake up in the middle of the night and can't get back to sleep within about 20 minutes, get out of bed and go to another room until you feel sleepy.  Avoid taking medicine right before bed that may keep you awake or make you feel hyper or energized. Your doctor can tell you if your medicine may do this and if you can take it earlier in the day.  If you can't sleep   Imagine yourself in a peaceful, pleasant scene. Focus on the details and feelings of being in a place that is relaxing.  Get up and do a quiet or boring activity until you feel sleepy.  Avoid drinking any liquids before going to bed to help prevent waking up often to use the bathroom.  Where can you learn more?  Go to https://www.[employer].co.uk.net/patiented  Enter J942 in the search box to learn more about \"Learning About Sleeping Well.\"  Current as of: July 10, 2023  Content Version: 14.2 2024 [facility].   Care instructions adapted under license by your healthcare professional. If you have questions about a medical condition or this instruction, always ask your healthcare professional. [employer] disclaims any warranty or liability for your use of this information.       "

## 2024-11-21 ENCOUNTER — MEDICAL CORRESPONDENCE (OUTPATIENT)
Dept: HEALTH INFORMATION MANAGEMENT | Facility: CLINIC | Age: 84
End: 2024-11-21
Payer: COMMERCIAL

## 2024-11-27 ENCOUNTER — LAB (OUTPATIENT)
Dept: LAB | Facility: CLINIC | Age: 84
End: 2024-11-27
Payer: COMMERCIAL

## 2024-11-27 DIAGNOSIS — D61.818 PANCYTOPENIA (H): ICD-10-CM

## 2024-11-27 LAB
BASOPHILS # BLD AUTO: 0 10E3/UL (ref 0–0.2)
BASOPHILS NFR BLD AUTO: 0 %
EOSINOPHIL # BLD AUTO: 0.2 10E3/UL (ref 0–0.7)
EOSINOPHIL NFR BLD AUTO: 5 %
ERYTHROCYTE [DISTWIDTH] IN BLOOD BY AUTOMATED COUNT: 18.2 % (ref 10–15)
HCT VFR BLD AUTO: 31.1 % (ref 40–53)
HGB BLD-MCNC: 9.8 G/DL (ref 13.3–17.7)
IMM GRANULOCYTES # BLD: 0 10E3/UL
IMM GRANULOCYTES NFR BLD: 0 %
LYMPHOCYTES # BLD AUTO: 0.7 10E3/UL (ref 0.8–5.3)
LYMPHOCYTES NFR BLD AUTO: 24 %
MCH RBC QN AUTO: 30.9 PG (ref 26.5–33)
MCHC RBC AUTO-ENTMCNC: 31.5 G/DL (ref 31.5–36.5)
MCV RBC AUTO: 98 FL (ref 78–100)
MONOCYTES # BLD AUTO: 0.6 10E3/UL (ref 0–1.3)
MONOCYTES NFR BLD AUTO: 20 %
NEUTROPHILS # BLD AUTO: 1.5 10E3/UL (ref 1.6–8.3)
NEUTROPHILS NFR BLD AUTO: 51 %
PLATELET # BLD AUTO: 103 10E3/UL (ref 150–450)
RBC # BLD AUTO: 3.17 10E6/UL (ref 4.4–5.9)
WBC # BLD AUTO: 3 10E3/UL (ref 4–11)

## 2024-11-27 PROCEDURE — 36415 COLL VENOUS BLD VENIPUNCTURE: CPT

## 2024-11-27 PROCEDURE — 85025 COMPLETE CBC W/AUTO DIFF WBC: CPT

## 2024-12-10 ENCOUNTER — TRANSFERRED RECORDS (OUTPATIENT)
Dept: HEALTH INFORMATION MANAGEMENT | Facility: CLINIC | Age: 84
End: 2024-12-10
Payer: COMMERCIAL

## 2024-12-17 DIAGNOSIS — M05.9 RHEUMATOID ARTHRITIS WITH POSITIVE RHEUMATOID FACTOR, INVOLVING UNSPECIFIED SITE (H): ICD-10-CM

## 2024-12-17 RX ORDER — PREDNISONE 5 MG/1
5 TABLET ORAL DAILY
Qty: 30 TABLET | Refills: 2 | Status: SHIPPED | OUTPATIENT
Start: 2024-12-17

## 2025-01-04 ENCOUNTER — LAB (OUTPATIENT)
Dept: LAB | Facility: CLINIC | Age: 85
End: 2025-01-04
Payer: COMMERCIAL

## 2025-01-04 DIAGNOSIS — D61.818 PANCYTOPENIA (H): ICD-10-CM

## 2025-01-04 LAB
BASOPHILS # BLD AUTO: 0 10E3/UL (ref 0–0.2)
BASOPHILS NFR BLD AUTO: 1 %
EOSINOPHIL # BLD AUTO: 0.1 10E3/UL (ref 0–0.7)
EOSINOPHIL NFR BLD AUTO: 3 %
ERYTHROCYTE [DISTWIDTH] IN BLOOD BY AUTOMATED COUNT: 18.5 % (ref 10–15)
HCT VFR BLD AUTO: 29 % (ref 40–53)
HGB BLD-MCNC: 9.4 G/DL (ref 13.3–17.7)
IMM GRANULOCYTES # BLD: 0 10E3/UL
IMM GRANULOCYTES NFR BLD: 0 %
LYMPHOCYTES # BLD AUTO: 0.6 10E3/UL (ref 0.8–5.3)
LYMPHOCYTES NFR BLD AUTO: 19 %
MCH RBC QN AUTO: 31.5 PG (ref 26.5–33)
MCHC RBC AUTO-ENTMCNC: 32.4 G/DL (ref 31.5–36.5)
MCV RBC AUTO: 97 FL (ref 78–100)
MONOCYTES # BLD AUTO: 0.8 10E3/UL (ref 0–1.3)
MONOCYTES NFR BLD AUTO: 26 %
NEUTROPHILS # BLD AUTO: 1.6 10E3/UL (ref 1.6–8.3)
NEUTROPHILS NFR BLD AUTO: 52 %
NRBC # BLD AUTO: 0 10E3/UL
NRBC BLD AUTO-RTO: 0 /100
PLATELET # BLD AUTO: 154 10E3/UL (ref 150–450)
RBC # BLD AUTO: 2.98 10E6/UL (ref 4.4–5.9)
WBC # BLD AUTO: 3 10E3/UL (ref 4–11)

## 2025-01-04 PROCEDURE — 36415 COLL VENOUS BLD VENIPUNCTURE: CPT

## 2025-01-04 PROCEDURE — 85025 COMPLETE CBC W/AUTO DIFF WBC: CPT

## 2025-01-09 ENCOUNTER — LAB (OUTPATIENT)
Dept: LAB | Facility: CLINIC | Age: 85
End: 2025-01-09
Payer: COMMERCIAL

## 2025-01-09 DIAGNOSIS — D61.818 PANCYTOPENIA (H): ICD-10-CM

## 2025-01-09 LAB
BASOPHILS # BLD AUTO: 0 10E3/UL (ref 0–0.2)
BASOPHILS NFR BLD AUTO: 1 %
EOSINOPHIL # BLD AUTO: 0 10E3/UL (ref 0–0.7)
EOSINOPHIL NFR BLD AUTO: 1 %
ERYTHROCYTE [DISTWIDTH] IN BLOOD BY AUTOMATED COUNT: 17.7 % (ref 10–15)
HCT VFR BLD AUTO: 28.4 % (ref 40–53)
HGB BLD-MCNC: 9.4 G/DL (ref 13.3–17.7)
IMM GRANULOCYTES # BLD: 0 10E3/UL
IMM GRANULOCYTES NFR BLD: 0 %
LYMPHOCYTES # BLD AUTO: 0.6 10E3/UL (ref 0.8–5.3)
LYMPHOCYTES NFR BLD AUTO: 16 %
MCH RBC QN AUTO: 31.5 PG (ref 26.5–33)
MCHC RBC AUTO-ENTMCNC: 33.1 G/DL (ref 31.5–36.5)
MCV RBC AUTO: 95 FL (ref 78–100)
MONOCYTES # BLD AUTO: 0.7 10E3/UL (ref 0–1.3)
MONOCYTES NFR BLD AUTO: 18 %
NEUTROPHILS # BLD AUTO: 2.5 10E3/UL (ref 1.6–8.3)
NEUTROPHILS NFR BLD AUTO: 65 %
PLATELET # BLD AUTO: 120 10E3/UL (ref 150–450)
RBC # BLD AUTO: 2.98 10E6/UL (ref 4.4–5.9)
WBC # BLD AUTO: 3.8 10E3/UL (ref 4–11)

## 2025-01-23 ENCOUNTER — LAB (OUTPATIENT)
Dept: LAB | Facility: CLINIC | Age: 85
End: 2025-01-23
Payer: COMMERCIAL

## 2025-01-23 DIAGNOSIS — D61.818 PANCYTOPENIA (H): ICD-10-CM

## 2025-01-30 DIAGNOSIS — D63.1 ANEMIA OF CHRONIC RENAL FAILURE, STAGE 2 (MILD): ICD-10-CM

## 2025-01-30 DIAGNOSIS — N18.2 ANEMIA OF CHRONIC RENAL FAILURE, STAGE 2 (MILD): ICD-10-CM

## 2025-01-30 DIAGNOSIS — K29.70 GASTRITIS, PRESENCE OF BLEEDING UNSPECIFIED, UNSPECIFIED CHRONICITY, UNSPECIFIED GASTRITIS TYPE: ICD-10-CM

## 2025-01-30 RX ORDER — FERROUS SULFATE 325(65) MG
TABLET ORAL
Qty: 90 TABLET | Refills: 1 | Status: SHIPPED | OUTPATIENT
Start: 2025-01-30